# Patient Record
(demographics unavailable — no encounter records)

---

## 2018-04-29 NOTE — XMS REPORT
Continuity of Care Document

 Created on: 2018



NATALIE BERNARD

External Reference #: [id number]

: 1992

Sex: Female



Demographics







 Address  [address]

[place]  [address]

 

 Home Phone  [phone] x

 

 Preferred Language  Unknown

 

 Marital Status  Unknown

 

 Zoroastrian Affiliation  Unknown

 

 Race  Unknown

 

 Ethnic Group  Unknown





Author







 Author  [facility]

 

 Address  Unknown

 

 Phone  Unavailable



              



Allergies

      



There is no data.                  



Medications

      



There is no data.                  



Problems

      





 Date Dx Coded            Attending            Type            Code            
Diagnosis            Diagnosed By        

 

 2009                                      626.4            irregular 
length of menstrual periods                     

 

 2009                                      V25.41            visit for: 
contraceptive surveillance pill                     

 

 2009                                      626.4            irregular 
length of menstrual periods                     

 

 2009                                      V25.41            visit for: 
contraceptive surveillance pill                     

 

 2009                                      626.4            irregular 
length of menstrual periods                     

 

 2009                                      V25.41            visit for: 
contraceptive surveillance pill                     

 

 2009                                      626.4            irregular 
length of menstrual periods                     

 

 2009                                      V25.41            visit for: 
contraceptive surveillance pill                     

 

 2009            HANY ANDRADE                         626.4      
      irregular length of menstrual periods                     

 

 2009            HANY ANDRADE                         V25.41     
       visit for: contraceptive surveillance pill                     

 

 2009            GENA TEE                         626.4      
      irregular length of menstrual periods                     

 

 2009            GENA TEE                         V25.41     
       visit for: contraceptive surveillance pill                     

 

 2009            FAVIO NORMAN                         
626.4            irregular length of menstrual periods                     

 

 2009            FAVIO NORMAN                         
V25.41            visit for: contraceptive surveillance pill                   
  

 

 2009            RICKY COHEN                         626.4 
           irregular length of menstrual periods                     

 

 2009            RICKY COHEN R                         V25.41
            visit for: contraceptive surveillance pill                     

 

 2009            JH SANDY DO                         626.4          
  irregular length of menstrual periods                     

 

 2009            JH SANDY DO                         V25.41         
   visit for: contraceptive surveillance pill                     

 

 2009                                      V20.2            Preventive 
Medicine Establ. Patient Checkup Adolescent 12-17                     

 

 2009                                      V20.2            Preventive 
Medicine Establ. Patient Checkup Adolescent 12-17                     

 

 2009                                      V20.2            Preventive 
Medicine Establ. Patient Checkup Adolescent 12-17                     

 

 2009                                      V20.2            Preventive 
Medicine Establ. Patient Checkup Adolescent 12-17                     

 

 2009            HANY ANDRADE                         V20.2      
      Preventive Medicine Establ. Patient Checkup Adolescent 12-17             
        

 

 2009            GENA TEE                         V20.2      
      Preventive Medicine Establ. Patient Checkup Adolescent 12-17             
        

 

 2009            FAVIO NORMAN                         
V20.2            Preventive Medicine Establ. Patient Checkup Adolescent 12-17  
                   

 

 2009            RICKY COHEN                         V20.2 
           Preventive Medicine Establ. Patient Checkup Adolescent 12-17        
             

 

 2009            JH SANDY DO                         V20.2          
  Preventive Medicine Establ. Patient Checkup Adolescent                                       477.9            RHINITIS     
                

 

 2010                                      692.71            SUNBURN     
                

 

 2010                                      V25.40            
CONTRACEPTIVE SURVEILLANCE UNSPECIFIED                     

 

 2010                                      V72.42            PREGNANCY 
TEST POSITIVE RESULT                     

 

 2010                                      V74.1            SPECIAL 
SCREENING EXAMINATION FOR PULMONARY TUBERCULOSIS                     

 

 2010                                      477.9            RHINITIS     
                

 

 2010                                      692.71            SUNBURN     
                

 

 2010                                      V25.40            
CONTRACEPTIVE SURVEILLANCE UNSPECIFIED                     

 

 2010                                      V72.42            PREGNANCY 
TEST POSITIVE RESULT                     

 

 2010                                      V74.1            SPECIAL 
SCREENING EXAMINATION FOR PULMONARY TUBERCULOSIS                     

 

 2010                                      477.9            RHINITIS     
                

 

 2010                                      692.71            SUNBURN     
                

 

 2010                                      V25.40            
CONTRACEPTIVE SURVEILLANCE UNSPECIFIED                     

 

 2010                                      V72.42            PREGNANCY 
TEST POSITIVE RESULT                     

 

 2010                                      V74.1            SPECIAL 
SCREENING EXAMINATION FOR PULMONARY TUBERCULOSIS                     

 

 2010                                      477.9            RHINITIS     
                

 

 2010                                      692.71            SUNBURN     
                

 

 2010                                      V25.40            
CONTRACEPTIVE SURVEILLANCE UNSPECIFIED                     

 

 2010                                      V72.42            PREGNANCY 
TEST POSITIVE RESULT                     

 

 2010                                      V74.1            SPECIAL 
SCREENING EXAMINATION FOR PULMONARY TUBERCULOSIS                     

 

 2010            HANY ANDRADE                         477.9      
      RHINITIS                     

 

 2010            HANY ANDRADE                         692.71     
       SUNBURN                     

 

 2010            HANY ANDRADE                         V25.40     
       CONTRACEPTIVE SURVEILLANCE UNSPECIFIED                     

 

 2010            HANY ANDRADE                         V72.42     
       PREGNANCY TEST POSITIVE RESULT                     

 

 2010            HANY ANDRADE                         V74.1      
      SPECIAL SCREENING EXAMINATION FOR PULMONARY TUBERCULOSIS                 
    

 

 2010            BENTLEY TEEIDI A                         477.9      
      RHINITIS                     

 

 2010            ALYX HORVATHGENA A                         692.71     
       SUNBURN                     

 

 2010            BENTLEY TEEIDI A                         V25.40     
       CONTRACEPTIVE SURVEILLANCE UNSPECIFIED                     

 

 2010            ALYX HORVATH GENA A                         V72.42     
       PREGNANCY TEST POSITIVE RESULT                     

 

 2010            BENTLEY TEEIDI A                         V74.1      
      SPECIAL SCREENING EXAMINATION FOR PULMONARY TUBERCULOSIS                 
    

 

 2010            SHALOM NORMANCY N                         
477.9            RHINITIS                     

 

 2010            SILVERIOSHALOM PLATTCY N                         
692.71            SUNBURN                     

 

 2010            SHALOM NORMANCY N                         
V25.40            CONTRACEPTIVE SURVEILLANCE UNSPECIFIED                     

 

 2010            SHALOM NORMANCY N                         
V72.42            PREGNANCY TEST POSITIVE RESULT                     

 

 2010            FAVIO NORMAN N                         
V74.1            SPECIAL SCREENING EXAMINATION FOR PULMONARY TUBERCULOSIS      
               

 

 2010            IRINA APRNAVDEEP RICKY R                         477.9 
           RHINITIS                     

 

 2010            IRINA APRNAVDEEP RICKY R                         692.71
            SUNBURN                     

 

 2010            IRINA APRSARA SETHIRICKY R                         V25.40
            CONTRACEPTIVE SURVEILLANCE UNSPECIFIED                     

 

 2010            IRINA APRSARA SETHIRICKY R                         V72.42
            PREGNANCY TEST POSITIVE RESULT                     

 

 2010            AREVALO APRSARA SETHIRICKY R                         V74.1 
           SPECIAL SCREENING EXAMINATION FOR PULMONARY TUBERCULOSIS            
         

 

 2010            SANDY DO, JH K                         477.9          
  RHINITIS                     

 

 2010            SANDY DO, JH K                         692.71         
   SUNBURN                     

 

 2010            SANDY DO, JH K                         V25.40         
   CONTRACEPTIVE SURVEILLANCE UNSPECIFIED                     

 

 2010            SANDY DO, JH K                         V72.42         
   PREGNANCY TEST POSITIVE RESULT                     

 

 2010            SANDY DO, JH K                         V74.1          
  SPECIAL SCREENING EXAMINATION FOR PULMONARY TUBERCULOSIS                     

 

 2010                                      780.52            INSOMNIA 
UNSPECIFIED                     

 

 2010                                      786.2            COUGH        
             

 

 2010                                      780.52            INSOMNIA 
UNSPECIFIED                     

 

 2010                                      786.2            COUGH        
             

 

 2010                                      780.52            INSOMNIA 
UNSPECIFIED                     

 

 2010                                      786.2            COUGH        
             

 

 2010                                      780.52            INSOMNIA 
UNSPECIFIED                     

 

 2010                                      786.2            COUGH        
             

 

 2010            HANY ANDRADE                         780.52     
       INSOMNIA UNSPECIFIED                     

 

 2010            HANY ANDRADE                         786.2      
      COUGH                     

 

 2010            ALYXBENTLEY CARRIDI A                         780.52     
       INSOMNIA UNSPECIFIED                     

 

 2010            ALYXBENTLEY CARRIDI A                         786.2      
      COUGH                     

 

 2010            NUSRAT OKEEFEFAVIO CONTRERAS N                         
780.52            INSOMNIA UNSPECIFIED                     

 

 2010            NUSRAT OKEEFEFAVIO CONTRERAS N                         
786.2            COUGH                     

 

 2010            SARA COHENRICIA R                         780.52
            INSOMNIA UNSPECIFIED                     

 

 2010            IRINA HORVATH RICKY R                         786.2 
           COUGH                     

 

 2010            SANDY DO, JH K                         780.52         
   INSOMNIA UNSPECIFIED                     

 

 2010            SANDY DO, JH K                         786.2          
  COUGH                     

 

 2010                                      780.50            SLEEP 
DISTURBANCE, UNSPECIFIED                     

 

 2010                                      780.50            SLEEP 
DISTURBANCE, UNSPECIFIED                     

 

 2010                                      780.50            SLEEP 
DISTURBANCE, UNSPECIFIED                     

 

 2010                                      780.50            SLEEP 
DISTURBANCE, UNSPECIFIED                     

 

 2010            HANY ANDRADE                         780.50     
       SLEEP DISTURBANCE, UNSPECIFIED                     

 

 2010            ALYXGENA CARR A                         780.50     
       SLEEP DISTURBANCE, UNSPECIFIED                     

 

 2010            NUSRAT OKEEFEMATTHEW APRFAVIO SETHI N                         
780.50            SLEEP DISTURBANCE, UNSPECIFIED                     

 

 2010            SARA COHENRICIA R                         780.50
            SLEEP DISTURBANCE, UNSPECIFIED                     

 

 2010            SANDY DO, JH K                         780.50         
   SLEEP DISTURBANCE, UNSPECIFIED                     

 

 10/13/2010                                      465.9            UPPER 
RESPIRATORY INFECTION                     

 

 10/13/2010                                      465.9            UPPER 
RESPIRATORY INFECTION                     

 

 10/13/2010                                      465.9            UPPER 
RESPIRATORY INFECTION                     

 

 10/13/2010                                      465.9            UPPER 
RESPIRATORY INFECTION                     

 

 10/13/2010            HANY ANDRADE                         465.9      
      UPPER RESPIRATORY INFECTION                     

 

 10/13/2010            GENA TEE A                         465.9      
      UPPER RESPIRATORY INFECTION                     

 

 10/13/2010            NUSRAT OKEEFEFAVIO CONTRERAS N                         
465.9            UPPER RESPIRATORY INFECTION                     

 

 10/13/2010            SARA COHENRICIA R                         465.9 
           UPPER RESPIRATORY INFECTION                     

 

 10/13/2010            SANDY DO, JH K                         465.9          
  UPPER RESPIRATORY INFECTION                     

 

 2010                                      V25.49            SURVEILLANCE 
OF OTHER CONTRACEPTIVE METHOD                     

 

 2010                                      V25.49            SURVEILLANCE 
OF OTHER CONTRACEPTIVE METHOD                     

 

 2010                                      V25.49            SURVEILLANCE 
OF OTHER CONTRACEPTIVE METHOD                     

 

 2010                                      V25.49            SURVEILLANCE 
OF OTHER CONTRACEPTIVE METHOD                     

 

 2010            HANY ANDRADE                         V25.49     
       SURVEILLANCE OF OTHER CONTRACEPTIVE METHOD                     

 

 2010            GENA TEE A                         V25.49     
       SURVEILLANCE OF OTHER CONTRACEPTIVE METHOD                     

 

 2010            FAVIO NORMAN N                         
V25.49            SURVEILLANCE OF OTHER CONTRACEPTIVE METHOD                   
  

 

 2010            RICKY COHEN                         V25.49
            SURVEILLANCE OF OTHER CONTRACEPTIVE METHOD                     

 

 2010            JH SANDY DO                         V25.49         
   SURVEILLANCE OF OTHER CONTRACEPTIVE METHOD                     

 

 2011                                      V25.09            
CONTRACEPTIVE COUNSELING                     

 

 2011                                      V65.45            STD 
COUNSELING                     

 

 2011                                      V72.31            GYN EXAM, 
ROUTINE                     

 

 2011                                      V74.5            STD SCREEN   
                  

 

 2011                                      V25.09            
CONTRACEPTIVE COUNSELING                     

 

 2011                                      V65.45            STD 
COUNSELING                     

 

 2011                                      V72.31            GYN EXAM, 
ROUTINE                     

 

 2011                                      V74.5            STD SCREEN   
                  

 

 2011                                      V25.09            
CONTRACEPTIVE COUNSELING                     

 

 2011                                      V65.45            STD 
COUNSELING                     

 

 2011                                      V72.31            GYN EXAM, 
ROUTINE                     

 

 2011                                      V74.5            STD SCREEN   
                  

 

 2011                                      V25.09            
CONTRACEPTIVE COUNSELING                     

 

 2011                                      V65.45            STD 
COUNSELING                     

 

 2011                                      V72.31            GYN EXAM, 
ROUTINE                     

 

 2011                                      V74.5            STD SCREEN   
                  

 

 2011            HANY ANDRADE                         V25.09     
       CONTRACEPTIVE COUNSELING                     

 

 2011            HANY ANDRADE                         V65.45     
       STD COUNSELING                     

 

 2011            HANY ANDRADE                         V72.31     
       GYN EXAM, ROUTINE                     

 

 2011            HANY ANDRADE                         V74.5      
      STD SCREEN                     

 

 2011            GENA TEE A                         V25.09     
       CONTRACEPTIVE COUNSELING                     

 

 2011            BENTLEY TEEIDI A                         V65.45     
       STD COUNSELING                     

 

 2011            ALYX HORVATH GENA A                         V72.31     
       GYN EXAM, ROUTINE                     

 

 2011            ALYX HORVATH GENA A                         V74.5      
      STD SCREEN                     

 

 2011            SILVERIO CASHERO APRN, FAVIO N                         
V25.09            CONTRACEPTIVE COUNSELING                     

 

 2011            NUSRAT GUTIÉRREZ APRN, FAVIO N                         
V65.45            STD COUNSELING                     

 

 2011            NUSRAT GUTIÉRREZ APRN, FAVIO N                         
V72.31            GYN EXAM, ROUTINE                     

 

 2011            NUSRAT GUTIÉRREZ APRN, FAVIO N                         
V74.5            STD SCREEN                     

 

 2011            IRINA APRN, RICKY R                         V25.09
            CONTRACEPTIVE COUNSELING                     

 

 2011            IRINA BIGGSN, RICKY R                         V65.45
            STD COUNSELING                     

 

 2011            IRINA BIGGSN, RICKY R                         V72.31
            GYN EXAM, ROUTINE                     

 

 2011            IRINA BIGGSN, RICKY R                         V74.5 
           STD SCREEN                     

 

 2011            SANDY DO JH K                         V25.09         
   CONTRACEPTIVE COUNSELING                     

 

 2011            SANDY DO JH K                         V65.45         
   STD COUNSELING                     

 

 2011            SANDY DO, JH K                         V72.31         
   GYN EXAM, ROUTINE                     

 

 2011            SANDY DO JH K                         V74.5          
  STD SCREEN                     

 

 2013                                      008.8            
GASTROENTERITIS, VIRAL                     

 

 2013                                      V06.1            TDAP DX      
               

 

 2013                                      008.8            
GASTROENTERITIS, VIRAL                     

 

 2013                                      V06.1            TDAP DX      
               

 

 2013                                      008.8            
GASTROENTERITIS, VIRAL                     

 

 2013                                      V06.1            TDAP DX      
               

 

 2013            HANY ANDRADE                         008.8      
      GASTROENTERITIS, VIRAL                     

 

 2013            HANY ANDRADE                         V06.1      
      TDAP DX                     

 

 2013            GENA TEE A                         008.8      
      GASTROENTERITIS, VIRAL                     

 

 2013            ALYX BIGGSN GENA A                         V06.1      
      TDAP DX                     

 

 2013            NUSRAT GUTIÉRREZ APRN, FAVIO N                         
008.8            GASTROENTERITIS, VIRAL                     

 

 2013            NUSRAT GUTIÉRREZ APRN, FAVIO N                         
V06.1            TDAP DX                     

 

 2013            IRINA APRNAVDEEP, RICKY R                         008.8 
           GASTROENTERITIS, VIRAL                     

 

 2013            IRINA APRN, RICKY R                         V06.1 
           TDAP DX                     

 

 2013            SANDY DO JH K                         008.8          
  GASTROENTERITIS, VIRAL                     

 

 2013            SANDY DO JH K                         V06.1          
  TDAP DX                     

 

 2013                                      625.8            OTHER 
SPECIFIED SYMPTOMS ASSOCIATED WITH FEMALE GENITAL ORGANS                     

 

 2013                                      V25.01            
CONTRACEPTION - ORAL CONTRACEPTION                     

 

 2013                                      625.8            OTHER 
SPECIFIED SYMPTOMS ASSOCIATED WITH FEMALE GENITAL ORGANS                     

 

 2013                                      V25.01            
CONTRACEPTION - ORAL CONTRACEPTION                     

 

 2013            MACKENZIE BIGGSHANY SETHI                         625.8      
      OTHER SPECIFIED SYMPTOMS ASSOCIATED WITH FEMALE GENITAL ORGANS           
          

 

 2013            MACKENZIE BIGGSHANY SETHI                         V25.01     
       CONTRACEPTION - ORAL CONTRACEPTION                     

 

 2013            BENTLEY TEEIDI A                         625.8      
      OTHER SPECIFIED SYMPTOMS ASSOCIATED WITH FEMALE GENITAL ORGANS           
          

 

 2013            BENTLEY TEEIDI A                         V25.01     
       CONTRACEPTION - ORAL CONTRACEPTION                     

 

 2013            NUSRAT GUTIÉRREZ APRFAVIO SETHI N                         
625.8            OTHER SPECIFIED SYMPTOMS ASSOCIATED WITH FEMALE GENITAL ORGANS
                     

 

 2013            NUSRAT GUTIÉRREZ APRFAVIO SETHI N                         
V25.01            CONTRACEPTION - ORAL CONTRACEPTION                     

 

 2013            RICKY COHEN R                         625.8 
           OTHER SPECIFIED SYMPTOMS ASSOCIATED WITH FEMALE GENITAL ORGANS      
               

 

 2013            RICKY COHEN R                         V25.01
            CONTRACEPTION - ORAL CONTRACEPTION                     

 

 2013            SANDY DO, JH K                         625.8          
  OTHER SPECIFIED SYMPTOMS ASSOCIATED WITH FEMALE GENITAL ORGANS               
      

 

 2013            SANDY DO, JH K                         V25.01         
   CONTRACEPTION - ORAL CONTRACEPTION                     

 

 01/15/2014            BENTLEY TEEIDI A                         278.00     
       OBESITY                     

 

 01/15/2014            BENTLEY TEEIDI A                         626.0      
      AMENORRHEA                     

 

 01/15/2014            NUSRAT GUTIÉRREZ APRFAVIO SETHI N                         
278.00            OBESITY                     

 

 01/15/2014            NUSRAT GUTIÉRREZ APRFAVIO SETHI N                         
626.0            AMENORRHEA                     

 

 01/15/2014            DANIEL COHENIA R                         278.00
            OBESITY                     

 

 01/15/2014            DANIEL COHENIA R                         626.0 
           AMENORRHEA                     

 

 01/15/2014            SANDY DO, JH K                         278.00         
   OBESITY                     

 

 01/15/2014            SANDY DO, JH K                         626.0          
  AMENORRHEA                     

 

 2014            FAVIO NORMAN N                         
388.72            REFERRED OTOGENIC PAIN                     

 

 2014            FAVIO NORMAN N                         
478.19            OTHER DISEASES OF NASAL CAVITY AND SINUSES                   
  

 

 2014            FAVIO NORMAN N                         
784.1            THROAT PAIN                     

 

 2014            RICKY COHEN R                         388.72
            REFERRED OTOGENIC PAIN                     

 

 2014            IRINA BIGGSNAVDEEPSARARICKY R                         478.19
            OTHER DISEASES OF NASAL CAVITY AND SINUSES                     

 

 2014            AREVALO SARA HORVATHRICIA R                         784.1 
           THROAT PAIN                     

 

 2014            JH SANDY DO                         388.72         
   REFERRED OTOGENIC PAIN                     

 

 2014            JH SANDY DO                         478.19         
   OTHER DISEASES OF NASAL CAVITY AND SINUSES                     

 

 2014            JH SANDY DO                         784.1          
  THROAT PAIN                     

 

 2015            JH SANDY DO                         692.4          
  CONTACT DERMATITIS AND OTHER ECZEMA DUE TO OTHER CHEMICAL PRODUCTS           
          

 

 2015            JH SANDY DO                         V72.41         
   PREGNANCY TEST NEGATIVE RESULT                     



                                                                               
                                                                               
                                                                               
                                                                               
                                                                               
                         



Procedures

      





 Code            Description            Performed By            Performed On   
     

 

             93799                                  UA LONG DIP                
                   2013        

 

             66595                                  URINE PREGNANCY TEST (IN-
HOUSE)                                   2013        

 

             64742                                  TRICHOMONAS (IN-HOUSE)     
                              2013        

 

             36624                                  CULTURE UROGENITAL         
                          08/15/2013        

 

             87073                                  CULTURE URINE              
                     08/15/2013        

 

             36559                                  GC/CHLAM PROBE ([facility])     
                              08/15/2013        

 

             10012                                  HERPES SIMPLEX CULTURE     
                              08/15/2013        

 

             81803                                  PREGNANCY TEST, URINE (IN-
HOUSE)                                   01/15/2014        

 

             16364                                  ROUTINE VENIPUNCTURE       
                            2014        

 

             [id number]                                  HCG QUALITATIVE W/ 
REFLEX                                   2014        

 

             74494                                  LIPID PANEL                
                   2014        

 

             56635                                  A1C (RML)                  
                 2014        

 

             68911                                  TSH                        
           2014        

 

             63568                                  INSULIN LEVEL              
                     2014        

 

             36905                                  TESTOSTERONE TOTAL-WOMEN & 
CHILDREN                                   2014        

 

             HERPSM1,2                                  HERPES SIMPLEX 1 AND 2, 
IGM, IGG                                   2014        

 

             17095                                  OXIMETRY                   
                2014        

 

             56376                                  PREGNANCY TEST, URINE (IN-
HOUSE)                                   2015        



                                                    



Results

      





 Test            Result            Range        









 Pap Lb, rfx HPV ASCU - 17 14:58         









 DIAGNOSIS:            Comment                      

 

 Specimen adequacy:            Comment                      

 

 Clinician provided ICD10:            Comment                      

 

 Performed by:            Comment                      

 

 .            .                      

 

 Note:            Comment                      

 

 .            Comment                      









 Genital Culture, Routine - 17 14:58         









 Genital Culture, Routine            Note                      









 CULTURE, GENITAL - 17 16:23         









 Genital Culture, Routine            Final report             NRG        

 

 Result 1                         NRG        









 Genital Culture, Routine - 17 16:23         









 Genital Culture, Routine            Note                      









 CBC With Differential/Platelet - 17 13:09         









 WBC            4.9 x10E3/uL            3.4-10.8        

 

 RBC            4.55 x10E6/uL            3.77-5.28        

 

 Hemoglobin            12.1 g/dL            11.1-15.9        

 

 Hematocrit            37.8 %            34.0-46.6        

 

 MCV            83 fL            79-97        

 

 MCH            26.6 pg            26.6-33.0        

 

 MCHC            32.0 g/dL            31.5-35.7        

 

 RDW            13.8 %            12.3-15.4        

 

 Platelets            334 x10E3/uL            150-379        

 

 Neutrophils            54 %                     

 

 Lymphs            36 %                     

 

 Monocytes            5 %                     

 

 Eos            4 %                     

 

 Basos            1 %                     

 

 Neutrophils (Absolute)            2.6 x10E3/uL            1.4-7.0        

 

 Lymphs (Absolute)            1.8 x10E3/uL            0.7-3.1        

 

 Monocytes(Absolute)            0.2 x10E3/uL            0.1-0.9        

 

 Eos (Absolute)            0.2 x10E3/uL            0.0-0.4        

 

 Baso (Absolute)            0.1 x10E3/uL            0.0-0.2        

 

 Immature Granulocytes            0 %                     

 

 Immature Grans (Abs)            0.0 x10E3/uL            0.0-0.1        









 Comp. Metabolic Panel (14) - 17 13:09         









 Glucose, Serum            88 mg/dL            65-99        

 

 BUN            10 mg/dL            6-20        

 

 Creatinine, Serum            0.73 mg/dL            0.57-1.00        

 

 eGFR If NonAfricn Am            116 mL/min/1.73                >59        

 

 eGFR If Africn Am            133 mL/min/1.73                >59        

 

 BUN/Creatinine Ratio            14             9-23        

 

 Sodium, Serum            142 mmol/L            134-144        

 

 Potassium, Serum            4.7 mmol/L            3.5-5.2        

 

 Chloride, Serum            104 mmol/L                    

 

 Carbon Dioxide, Total            23 mmol/L            18-29        

 

 Calcium, Serum            9.2 mg/dL            8.7-10.2        

 

 Protein, Total, Serum            6.9 g/dL            6.0-8.5        

 

 Albumin, Serum            4.2 g/dL            3.5-5.5        

 

 Globulin, Total            2.7 g/dL            1.5-4.5        

 

 A/G Ratio            1.6             1.2-2.2        

 

 Bilirubin, Total            0.5 mg/dL            0.0-1.2        

 

 Alkaline Phosphatase, S            49 IU/L                    

 

 AST (SGOT)            17 IU/L            0-40        

 

 ALT (SGPT)            17 IU/L            0-32        









 Lipid Panel - 17 13:09         









 Cholesterol, Total            148 mg/dL            100-199        

 

 Triglycerides            57 mg/dL            0-149        

 

 HDL Cholesterol            48 mg/dL            >39        

 

 VLDL Cholesterol Rodolfo            11 mg/dL            5-40        

 

 LDL Cholesterol Calc            89 mg/dL            0-99        









 Hemoglobin A1c - 17 13:09         









 Hemoglobin A1c            5.3 %            4.8-5.6        









 FSH, Serum - 17 13:09         









 FSH            5.9 mIU/mL                     









 Prolactin - 17 13:09         









 Prolactin            10.4 ng/mL            4.8-23.3        









 Estradiol - 17 13:09         









 Estradiol            41.1 pg/mL                     









 Thyroid Cascade Profile - 17 13:09         









 TSH            1.030 uIU/mL            0.450-4.500        









 Insulin - 17 13:09         









 Insulin            11.3 uIU/mL            2.6-24.9        









 CBC - 17 13:09         









 WBC            4.9 x10E3/uL            3.4-10.8        

 

 RBC            4.55 x10E6/uL            3.77-5.28        

 

 Hemoglobin            12.1 g/dL            11.1-15.9        

 

 Hematocrit            37.8 %            34.0-46.6        

 

 MCV            83 fL            79-97        

 

 MCH            26.6 pg            26.6-33.0        

 

 MCHC            32.0 g/dL            31.5-35.7        

 

 RDW            13.8 %            12.3-15.4        

 

 Platelets            334 x10E3/uL            150-379        

 

 Neutrophils            54 %            NRG        

 

 Lymphs            36 %            NRG        

 

 Monocytes            5 %            NRG        

 

 Eos            4 %            NRG        

 

 Basos            1 %            NRG        

 

 Neutrophils (Absolute)            2.6 x10E3/uL            1.4-7.0        

 

 Lymphs (Absolute)            1.8 x10E3/uL            0.7-3.1        

 

 Monocytes(Absolute)            0.2 x10E3/uL            0.1-0.9        

 

 Eos (Absolute)            0.2 x10E3/uL            0.0-0.4        

 

 Baso (Absolute)            0.1 x10E3/uL            0.0-0.2        

 

 Immature Granulocytes            0 %            NRG        

 

 Immature Grans (Abs)            0.0 x10E3/uL            0.0-0.1        









 LIPID PANEL - 17 13:09         









 Cholesterol, Total            148 mg/dL            100-199        

 

 Triglycerides            57 mg/dL            0-149        

 

 HDL Cholesterol            48 mg/dL            >39        

 

 VLDL Cholesterol Rodolfo            11 mg/dL            5-40        

 

 LDL Cholesterol Calc            89 mg/dL            0-99        









 CMP - 17 13:09         









 Glucose, Serum            88 mg/dL            65-99        

 

 BUN            10 mg/dL            6-20        

 

 Creatinine, Serum            0.73 mg/dL            0.57-1.00        

 

 eGFR If NonAfricn Am            116 mL/min/1.73                >59        

 

 eGFR If Africn Am            133 mL/min/1.73                >59        

 

 BUN/Creatinine Ratio            14             9-23        

 

 Sodium, Serum            142 mmol/L            134-144        

 

 Potassium, Serum            4.7 mmol/L            3.5-5.2        

 

 Chloride, Serum            104 mmol/L                    

 

 Carbon Dioxide, Total            23 mmol/L            18-29        

 

 Calcium, Serum            9.2 mg/dL            8.7-10.2        

 

 Protein, Total, Serum            6.9 g/dL            6.0-8.5        

 

 Albumin, Serum            4.2 g/dL            3.5-5.5        

 

 Globulin, Total            2.7 g/dL            1.5-4.5        

 

 A/G Ratio            1.6             1.2-2.2        

 

 Bilirubin, Total            0.5 mg/dL            0.0-1.2        

 

 Alkaline Phosphatase, S            49 IU/L                    

 

 AST (SGOT)            17 IU/L            0-40        

 

 ALT (SGPT)            17 IU/L            0-32        









 CULTURE, URINE - 17 10:23         









 CULTURE, URINE, ROUTINE            SEE NOTE             NRG        









 VITAMIN B12 - 18 12:25         









 VITAMIN B12            415 pg/mL            200-1100        









 VITAMIN D, 25-H - 18 12:25         









 VITAMIN D,25-OH,TOTAL,IA            23 ng/mL                    









 CULTURE, THROAT - 18 12:22         









 CULTURE, THROAT            SEE NOTE             NRG        



                                                      



Encounters

      





 ACCT No.            Visit Date/Time            Discharge            Status    
        Pt. Type            Provider            Facility            Loc./Unit  
          Complaint        

 

 [id number]            2015 13:31:00            2015 23:59:59          
  CLS            Outpatient            JH SANDY DO                        
                       

 

 [id number]            2014 14:48:00            2014 23:59:59          
  CLS            Outpatient            RICKY COHEN               
                                

 

 [id number]            2014 10:22:00            2014 23:59:59          
  CLS            Outpatient            FAVIO NORMAN           
                                    

 

 [id number]            2014 08:55:00            2014 23:59:59          
  CLS            Outpatient            GENA TEE                    
                           

 

 [id number]            2013 10:41:00            2013 23:59:59          
  CLS            Outpatient            HANY ANDRADE                    
                           

 

 [id number]            2013 14:32:00            2013 23:59:59          
  CLS            Outpatient                                                    
        

 

 [id number]            2012 14:11:00            2012 23:59:59            
CLS            Outpatient                                                      
      

 

 [id number]            2013 13:03:00                                      
Document Registration                                                          
  

 

 [id number]            2013 13:42:00                                      
Document Registration                                                          
  

 

 [id number]            2017 14:10:00                                   
   Document Registration                                                       
     

 

 [id number]            2017 09:08:00                                   
   Document Registration                                                       
     

 

 [id number]            2017 16:07:00                                   
   Document Registration                                                       
     

 

 [id number]            2018 10:30:00            2018 23:59:59            
CLS            Outpatient            RICHARD TYRON                         
[facility]                     

 

 [id number]            2018 11:40:00                                      
Document Registration                                                          
  

 

 [id number]            2018 11:00:00                                      
Document Registration                                                          
  

 

 [id number]            2017 09:40:00                                      
Document Registration                                                          
  

 

 [id number]            2017 13:00:00                                      
Document Registration                                                          
  

 

 [id number]            2017 15:20:00                                      
Document Registration                                                          
  

 

 [id number]            2017 14:06:00                                   
   Document Registration

## 2018-04-29 NOTE — XMS REPORT
[facility]

 Created on: 2018



Louise Duvall

External Reference #: [id number]

: 1992

Sex: Female



Demographics







 Address  [address] [place]  [address]

 

 Preferred Language  Unknown

 

 Marital Status  Unknown

 

 Latter-day Affiliation  Unknown

 

 Race  Unknown

 

 Ethnic Group  Unknown





Author







 Author  FITO  ANIL

 

 [facility]

 

 Address  [address] [place]  [address]



 

 Phone  [phone]







Care Team Providers







 Care Team Member Name  Role  Phone

 

 KEY PAYTONY  Unavailable  [phone]







PROBLEMS







 Type  Condition  ICD9-CM Code  OYD23-BQ Code  Onset Dates  Condition Status  
SNOMED Code

 

 Problem  Vitamin D insufficiency     E55.9     Active  563228754

 

 Problem  Obesity (BMI 30.0-34.9)     E66.9     Active  636253947098236

 

 Problem  Amenorrhea     N91.2     Active  97026221

 

 Problem  Seasonal allergic rhinitis, unspecified trigger     J30.2     Active  
177363304

 

 Problem  Moderate episode of recurrent major depressive disorder     F33.1     
Active  550479956

 

 Problem  Burning sensation of skin     R20.8     Active  32059803

 

 Problem  Irregular menstrual cycle     N92.6     Active  48652740

 

 Problem  Major depressive disorder, single episode, unspecified     F32.9     
Active  16573706

 

 Problem  Acute stress reaction     F43.0     Active  38348544







ALLERGIES

No Known Allergies



ENCOUNTERS







 Encounter  Location  Date  Diagnosis

 

 [place] [id number] [place] [address]-
1289  14 May, 2018   

 

 [place] [address] [address]-
1732    Sore throat J02.9 and Seasonal allergic rhinitis, 
unspecified trigger J30.2

 

 [facility]  [id number] [place] [address]-
4510     

 

 [place] [address] [address]-
4761     

 

 [facility]  [id number] [place] [address]-
1648    Sore throat J02.9 and Viral pharyngitis J02.9

 

 [place] [id number] [place] [address]
0049  05 2018  Moderate episode of recurrent major depressive disorder 
F33.1 ; Other fatigue R53.83 and Oral contraception initial prescription Z30.011

 

 [facility] [place] [id number] [place] [phone]
-[phone]  30 Dec, 2017  Acute non-recurrent frontal sinusitis J01.10

 

 [place] [address] [address]-
2590  19 Dec, 2017  Dysuria R30.0 ; Burning sensation of skin R20.8 and Acute 
stress reaction F43.0

 

 [facility] [place] [address] [address]
[phone]  13 Dec, 2017  Other viral agents as the cause of diseases classified 
elsewhere B97.89 and Acute upper respiratory infection, unspecified J06.9

 

 [place] [id number] [place] [address]27-
2985  07 Dec, 2017  Dysuria R30.0 and Acute cystitis with hematuria N30.01

 

 [place] [id number] [place] [address]-
3729  27 Oct, 2017  Encounter to establish care Z76.89 ; Obesity (BMI 30.0-34.9
) E66.9 and Irregular menstrual cycle N92.6

 

 [place] [id number] [place] [address]-
7104  24 Oct, 2017   

 

 [place] [id number] [place] [address]-
9837  22 Sep, 2017  Amenorrhea N91.2

 

 [place] [id number] [place] [address] 16993-
4160  21 Sep, 2017  Vaginal discharge N89.8 ; Amenorrhea N91.2 and Obesity (BMI 
30.0-34.9) E66.9

 

 [place] [id number] [place] [address]-
4032  25 Aug, 2017  Plantar fasciitis of left foot M72.2

 

 [facility]  [address] [place] [phone]
[phone]  01 May, 2017  Sore throat J02.9 and Strep pharyngitis J02.0

 

 [facility]  [id number] [place] [address] 30498
-[phone]    Sore throat J02.9 and Acute tonsillitis due to other 
specified organisms J03.80

 

 [place] [address] [address]-
2039  14 2017  Routine gynecological examination Z01.419 and Depot 
contraception Z30.40

 

 [place] [address] [address]-
6174  03 Aug, 2016  Recent urinary tract infection Z87.440

 

 [place] [id number] [place] [address]697-
3449    Acute cystitis without hematuria N30.00

 

 [place] [id number] [place] [address] 09891-
0507    Urinary tract infection N39.0

 

 [place] [id number] [place] [address] 23994-
7964  23 May, 2016  Sinusitis, unspecified chronicity, unspecified location 
J32.9

 

 [place] [id number] [place] [address] 44166-
0144    Allergic conjunctivitis H10.10

 

 [place] [id number] [place] [address] 79194-
0849  28 Oct, 2015  Allergic rhinitis J30.9

 

 [place] [id number] [place] [address] 83024-
5519  25 Oct, 2015   

 

 [place] [id number] [place] [address]-
0674  22 Oct, 2015  Encounter for screening for malignant neoplasm of cervix 
Z12.4 ; Missed period N92.6 ; Vaginal discharge N89.8 ; Weight gain R63.5 and 
Acne L70.9

 

 [place] [id number] [address]-
7226    Nausea and vomiting 787.01 and UTI (urinary tract infection
) 599.0

 

 [facility]  [address] [id number] [place] [address]-
2666    Eustachian tube dysfunction 381.81

 

 [facility]  [address] [id number] [place] [address]-
1706     

 

 [facility]  [address] [id number] [place] [address]-
8596     

 

 [facility]  [address] [id number] [place] [address]-
5666     

 

 [facility]  [address] [id number] [place] [address]-
1716     

 

 [facility]  [address] [id number] [place] [address]-
4786  02 Sep, 2014   

 

 [facility]  [address] [id number] [place] [address]-
3191  02 Sep, 2014   

 

 [facility]  [address] [id number] [place] [address]-
8016  13 Aug, 2014   

 

 [facility]  [address] [id number] [place] [address]-
8741  13 Aug, 2014   

 

 [facility]  [address] [id number] [place] [address]-
2946  13 May, 2014   

 

 [facility]  [address] [id number] [address]
2546  13 May, 2014   

 

 [facility]  [address] [id number] [place] [address]-
6686     

 

 [facility]  [address] [id number] [address]
2546     

 

 [facility]  [address] [id number] [address]-
4386     

 

 [facility]  [address] [id number] [place] [address]-
9940     

 

 [facility]  [address] [id number] [place] [address]-
8869     

 

 [facility] [place] [facility]  [address] [id number] [place] [address]-
6958     

 

 [facility]  [address] [id number] [place] [address]-
5014     

 

 [facility]  [address] [id number] [place] [address]-
1426  15 Harsha, 2014   

 

 [facility]  [address] [id number] [place] [address]-
3536  15 Harsha, 2014   

 

 [facility]  [address] [id number] [place] [address]-
4106     

 

 [facility]  [address] [id number] [place] [address]-
9621     

 

 [facility]  [address] [id number] [place] [address]-
0179  30 Aug, 2013   

 

 [facility]  [address] [id number] [place] [address]-
4749  19 Aug, 2013   

 

 [facility] [place] [facility]  [address] [id number] [place] [address]-
0791  15 Aug, 2013   

 

 [facility] [place] [facility]  [address] [id number] [place] [address]-
0197  14 Aug, 2013   

 

 [facility] [place] [facility]  [address] [id number] [place] [address]-
6378  13 Aug, 2013   

 

 [facility] [place] [facility]  [address] [id number] [place] [address]-
3362  27 Mar, 2013   

 

 [facility] [place] [facility]  [address] [id number] [place] [address]-
8311  20 Mar, 2013   

 

 [facility] [place] [facility]  [address] [id number] [address]-
2546     

 

 [facility]  [id number] [address]-
5892     

 

 [facility]  [id number] [address]-
1036     

 

 [facility]  [id number] [address]-
2546  15 Mar, 2012   

 

 [facility]  [id number] [address]-
3348  05 Mar, 2012   

 

 [facility]  [id number] [address]-
1592     

 

 [facility]  [id number] [address]-
2406  23 Dec, 2011   

 

 [facility]  [id number] [address]-
5825  14 Sep, 2011   

 

 [facility]  [id number] [address]-
7011  21 Dec, 2010   

 

 [facility]  [id number] [address]-
0982  13 Oct, 2010   

 

 [facility]  [id number] [address]-
1259  13 Oct, 2010   

 

 [facility]  [id number] [address]-
4344  14 Aug, 2009   

 

 [facility]  [id number] [address]-
5399     







IMMUNIZATIONS

No Known Immunizations



SOCIAL HISTORY

Never Assessed



REASON FOR VISIT

Pain (acute) on left foot -- chong weston



PLAN OF CARE







 Activity  Details









  









 Follow Up  prn Reason:







VITAL SIGNS







 Height  62 in  2017

 

 Weight  175.9 lbs  2017

 

 Temperature  98.0 degrees Fahrenheit  2017

 

 Heart Rate  78 bpm  2017

 

 Respiratory Rate  18   2017

 

 BMI  32.17 kg/m2  2017

 

 Blood pressure systolic  120 mmHg  2017

 

 Blood pressure diastolic  76 mmHg  2017







MEDICATIONS







 Medication  Instructions  Dosage  Frequency  Start Date  End Date  Duration  
Status

 

 Naprosyn 500 mg  Orally every 12 hrs  1 tablet as needed  12h  25 Aug, 2017   
     Active







RESULTS

No Results



PROCEDURES

No Known procedures



INSTRUCTIONS





MEDICATIONS ADMINISTERED

No Known Medications



MEDICAL (GENERAL) HISTORY







 Type  Description  Date

 

 Medical History  Intestinal infection due to other organism   

 

 Hospitalization History  pneumonia as a child

## 2018-04-29 NOTE — XMS REPORT
Continuity of Care Document

 Created on: 2018



NATALIE BERNARD

External Reference #: [id number]

: 1992

Sex: Female



Demographics







 Address  [address]

[place]  [address]

 

 Home Phone  [phone] x

 

 Preferred Language  Unknown

 

 Marital Status  Unknown

 

 Anglican Affiliation  Unknown

 

 Race  Unknown

 

 Ethnic Group  Unknown





Author







 Author  [facility]

 

 Address  Unknown

 

 Phone  Unavailable



              



Allergies

      



There is no data.                  



Medications

      



There is no data.                  



Problems

      





 Date Dx Coded            Attending            Type            Code            
Diagnosis            Diagnosed By        

 

 2009                                      626.4            irregular 
length of menstrual periods                     

 

 2009                                      V25.41            visit for: 
contraceptive surveillance pill                     

 

 2009                                      626.4            irregular 
length of menstrual periods                     

 

 2009                                      V25.41            visit for: 
contraceptive surveillance pill                     

 

 2009                                      626.4            irregular 
length of menstrual periods                     

 

 2009                                      V25.41            visit for: 
contraceptive surveillance pill                     

 

 2009                                      626.4            irregular 
length of menstrual periods                     

 

 2009                                      V25.41            visit for: 
contraceptive surveillance pill                     

 

 2009            HANY ANDRADE                         626.4      
      irregular length of menstrual periods                     

 

 2009            HANY ANDRADE                         V25.41     
       visit for: contraceptive surveillance pill                     

 

 2009            GENA TEE                         626.4      
      irregular length of menstrual periods                     

 

 2009            GENA TEE                         V25.41     
       visit for: contraceptive surveillance pill                     

 

 2009            FAVIO NORMAN                         
626.4            irregular length of menstrual periods                     

 

 2009            FAVIO NORMAN                         
V25.41            visit for: contraceptive surveillance pill                   
  

 

 2009            RICKY COHEN                         626.4 
           irregular length of menstrual periods                     

 

 2009            RICKY COHEN R                         V25.41
            visit for: contraceptive surveillance pill                     

 

 2009            JH SANDY DO                         626.4          
  irregular length of menstrual periods                     

 

 2009            JH SANDY DO                         V25.41         
   visit for: contraceptive surveillance pill                     

 

 2009                                      V20.2            Preventive 
Medicine Establ. Patient Checkup Adolescent 12-17                     

 

 2009                                      V20.2            Preventive 
Medicine Establ. Patient Checkup Adolescent 12-17                     

 

 2009                                      V20.2            Preventive 
Medicine Establ. Patient Checkup Adolescent 12-17                     

 

 2009                                      V20.2            Preventive 
Medicine Establ. Patient Checkup Adolescent 12-17                     

 

 2009            HANY ANDRADE                         V20.2      
      Preventive Medicine Establ. Patient Checkup Adolescent 12-17             
        

 

 2009            GENA TEE                         V20.2      
      Preventive Medicine Establ. Patient Checkup Adolescent 12-17             
        

 

 2009            FAVIO NORMAN                         
V20.2            Preventive Medicine Establ. Patient Checkup Adolescent 12-17  
                   

 

 2009            RICKY COHEN                         V20.2 
           Preventive Medicine Establ. Patient Checkup Adolescent 12-17        
             

 

 2009            JH SANDY DO                         V20.2          
  Preventive Medicine Establ. Patient Checkup Adolescent                                       477.9            RHINITIS     
                

 

 2010                                      692.71            SUNBURN     
                

 

 2010                                      V25.40            
CONTRACEPTIVE SURVEILLANCE UNSPECIFIED                     

 

 2010                                      V72.42            PREGNANCY 
TEST POSITIVE RESULT                     

 

 2010                                      V74.1            SPECIAL 
SCREENING EXAMINATION FOR PULMONARY TUBERCULOSIS                     

 

 2010                                      477.9            RHINITIS     
                

 

 2010                                      692.71            SUNBURN     
                

 

 2010                                      V25.40            
CONTRACEPTIVE SURVEILLANCE UNSPECIFIED                     

 

 2010                                      V72.42            PREGNANCY 
TEST POSITIVE RESULT                     

 

 2010                                      V74.1            SPECIAL 
SCREENING EXAMINATION FOR PULMONARY TUBERCULOSIS                     

 

 2010                                      477.9            RHINITIS     
                

 

 2010                                      692.71            SUNBURN     
                

 

 2010                                      V25.40            
CONTRACEPTIVE SURVEILLANCE UNSPECIFIED                     

 

 2010                                      V72.42            PREGNANCY 
TEST POSITIVE RESULT                     

 

 2010                                      V74.1            SPECIAL 
SCREENING EXAMINATION FOR PULMONARY TUBERCULOSIS                     

 

 2010                                      477.9            RHINITIS     
                

 

 2010                                      692.71            SUNBURN     
                

 

 2010                                      V25.40            
CONTRACEPTIVE SURVEILLANCE UNSPECIFIED                     

 

 2010                                      V72.42            PREGNANCY 
TEST POSITIVE RESULT                     

 

 2010                                      V74.1            SPECIAL 
SCREENING EXAMINATION FOR PULMONARY TUBERCULOSIS                     

 

 2010            HANY ANDRADE                         477.9      
      RHINITIS                     

 

 2010            HANY ANDRADE                         692.71     
       SUNBURN                     

 

 2010            HANY ANDRADE                         V25.40     
       CONTRACEPTIVE SURVEILLANCE UNSPECIFIED                     

 

 2010            HANY ANDRADE                         V72.42     
       PREGNANCY TEST POSITIVE RESULT                     

 

 2010            HANY ANDRADE                         V74.1      
      SPECIAL SCREENING EXAMINATION FOR PULMONARY TUBERCULOSIS                 
    

 

 2010            BENTLEY TEEIDI A                         477.9      
      RHINITIS                     

 

 2010            ALYX HORVATHGENA A                         692.71     
       SUNBURN                     

 

 2010            BENTLEY TEEIDI A                         V25.40     
       CONTRACEPTIVE SURVEILLANCE UNSPECIFIED                     

 

 2010            ALYX HORVATH GENA A                         V72.42     
       PREGNANCY TEST POSITIVE RESULT                     

 

 2010            BENTLEY TEEIDI A                         V74.1      
      SPECIAL SCREENING EXAMINATION FOR PULMONARY TUBERCULOSIS                 
    

 

 2010            SHALOM NORMANCY N                         
477.9            RHINITIS                     

 

 2010            SILVERIOSHALOM PLATTCY N                         
692.71            SUNBURN                     

 

 2010            SHALOM NORMANCY N                         
V25.40            CONTRACEPTIVE SURVEILLANCE UNSPECIFIED                     

 

 2010            SHALOM NORMANCY N                         
V72.42            PREGNANCY TEST POSITIVE RESULT                     

 

 2010            FAVIO NORMAN N                         
V74.1            SPECIAL SCREENING EXAMINATION FOR PULMONARY TUBERCULOSIS      
               

 

 2010            IRINA APRNAVDEEP RICKY R                         477.9 
           RHINITIS                     

 

 2010            IRINA APRNAVDEEP RICKY R                         692.71
            SUNBURN                     

 

 2010            IRINA APRSARA SETHIRICKY R                         V25.40
            CONTRACEPTIVE SURVEILLANCE UNSPECIFIED                     

 

 2010            IRINA APRSARA SETHIRICKY R                         V72.42
            PREGNANCY TEST POSITIVE RESULT                     

 

 2010            AREVALO APRSARA SETHIRICKY R                         V74.1 
           SPECIAL SCREENING EXAMINATION FOR PULMONARY TUBERCULOSIS            
         

 

 2010            SANDY DO, JH K                         477.9          
  RHINITIS                     

 

 2010            SANDY DO, JH K                         692.71         
   SUNBURN                     

 

 2010            SANDY DO, JH K                         V25.40         
   CONTRACEPTIVE SURVEILLANCE UNSPECIFIED                     

 

 2010            SANDY DO, JH K                         V72.42         
   PREGNANCY TEST POSITIVE RESULT                     

 

 2010            SANDY DO, JH K                         V74.1          
  SPECIAL SCREENING EXAMINATION FOR PULMONARY TUBERCULOSIS                     

 

 2010                                      780.52            INSOMNIA 
UNSPECIFIED                     

 

 2010                                      786.2            COUGH        
             

 

 2010                                      780.52            INSOMNIA 
UNSPECIFIED                     

 

 2010                                      786.2            COUGH        
             

 

 2010                                      780.52            INSOMNIA 
UNSPECIFIED                     

 

 2010                                      786.2            COUGH        
             

 

 2010                                      780.52            INSOMNIA 
UNSPECIFIED                     

 

 2010                                      786.2            COUGH        
             

 

 2010            HANY ANDRADE                         780.52     
       INSOMNIA UNSPECIFIED                     

 

 2010            HANY ANDRADE                         786.2      
      COUGH                     

 

 2010            ALYXBENTLEY CARRIDI A                         780.52     
       INSOMNIA UNSPECIFIED                     

 

 2010            ALYXBENTLEY CARRIDI A                         786.2      
      COUGH                     

 

 2010            NUSRAT OKEEFEFAVIO CONTRERAS N                         
780.52            INSOMNIA UNSPECIFIED                     

 

 2010            NUSRAT OKEEFEFAVIO CONTRERAS N                         
786.2            COUGH                     

 

 2010            SARA COHENRICIA R                         780.52
            INSOMNIA UNSPECIFIED                     

 

 2010            IRINA HORVATH RICKY R                         786.2 
           COUGH                     

 

 2010            SANDY DO, JH K                         780.52         
   INSOMNIA UNSPECIFIED                     

 

 2010            SANDY DO, JH K                         786.2          
  COUGH                     

 

 2010                                      780.50            SLEEP 
DISTURBANCE, UNSPECIFIED                     

 

 2010                                      780.50            SLEEP 
DISTURBANCE, UNSPECIFIED                     

 

 2010                                      780.50            SLEEP 
DISTURBANCE, UNSPECIFIED                     

 

 2010                                      780.50            SLEEP 
DISTURBANCE, UNSPECIFIED                     

 

 2010            HANY ANDRADE                         780.50     
       SLEEP DISTURBANCE, UNSPECIFIED                     

 

 2010            ALYXGENA CARR A                         780.50     
       SLEEP DISTURBANCE, UNSPECIFIED                     

 

 2010            NUSRAT OKEEFEMATTHEW APRFAVIO SETHI N                         
780.50            SLEEP DISTURBANCE, UNSPECIFIED                     

 

 2010            SARA COHENRICIA R                         780.50
            SLEEP DISTURBANCE, UNSPECIFIED                     

 

 2010            SANDY DO, JH K                         780.50         
   SLEEP DISTURBANCE, UNSPECIFIED                     

 

 10/13/2010                                      465.9            UPPER 
RESPIRATORY INFECTION                     

 

 10/13/2010                                      465.9            UPPER 
RESPIRATORY INFECTION                     

 

 10/13/2010                                      465.9            UPPER 
RESPIRATORY INFECTION                     

 

 10/13/2010                                      465.9            UPPER 
RESPIRATORY INFECTION                     

 

 10/13/2010            HANY ANDRADE                         465.9      
      UPPER RESPIRATORY INFECTION                     

 

 10/13/2010            GENA TEE A                         465.9      
      UPPER RESPIRATORY INFECTION                     

 

 10/13/2010            NUSRAT OKEEFEFAVIO CONTRERAS N                         
465.9            UPPER RESPIRATORY INFECTION                     

 

 10/13/2010            SARA COHENRICIA R                         465.9 
           UPPER RESPIRATORY INFECTION                     

 

 10/13/2010            SANDY DO, JH K                         465.9          
  UPPER RESPIRATORY INFECTION                     

 

 2010                                      V25.49            SURVEILLANCE 
OF OTHER CONTRACEPTIVE METHOD                     

 

 2010                                      V25.49            SURVEILLANCE 
OF OTHER CONTRACEPTIVE METHOD                     

 

 2010                                      V25.49            SURVEILLANCE 
OF OTHER CONTRACEPTIVE METHOD                     

 

 2010                                      V25.49            SURVEILLANCE 
OF OTHER CONTRACEPTIVE METHOD                     

 

 2010            HANY ANDRADE                         V25.49     
       SURVEILLANCE OF OTHER CONTRACEPTIVE METHOD                     

 

 2010            GENA TEE A                         V25.49     
       SURVEILLANCE OF OTHER CONTRACEPTIVE METHOD                     

 

 2010            FAVIO NORMAN N                         
V25.49            SURVEILLANCE OF OTHER CONTRACEPTIVE METHOD                   
  

 

 2010            RICKY COHEN                         V25.49
            SURVEILLANCE OF OTHER CONTRACEPTIVE METHOD                     

 

 2010            JH SANDY DO                         V25.49         
   SURVEILLANCE OF OTHER CONTRACEPTIVE METHOD                     

 

 2011                                      V25.09            
CONTRACEPTIVE COUNSELING                     

 

 2011                                      V65.45            STD 
COUNSELING                     

 

 2011                                      V72.31            GYN EXAM, 
ROUTINE                     

 

 2011                                      V74.5            STD SCREEN   
                  

 

 2011                                      V25.09            
CONTRACEPTIVE COUNSELING                     

 

 2011                                      V65.45            STD 
COUNSELING                     

 

 2011                                      V72.31            GYN EXAM, 
ROUTINE                     

 

 2011                                      V74.5            STD SCREEN   
                  

 

 2011                                      V25.09            
CONTRACEPTIVE COUNSELING                     

 

 2011                                      V65.45            STD 
COUNSELING                     

 

 2011                                      V72.31            GYN EXAM, 
ROUTINE                     

 

 2011                                      V74.5            STD SCREEN   
                  

 

 2011                                      V25.09            
CONTRACEPTIVE COUNSELING                     

 

 2011                                      V65.45            STD 
COUNSELING                     

 

 2011                                      V72.31            GYN EXAM, 
ROUTINE                     

 

 2011                                      V74.5            STD SCREEN   
                  

 

 2011            HANY ANDRADE                         V25.09     
       CONTRACEPTIVE COUNSELING                     

 

 2011            HANY ANDRADE                         V65.45     
       STD COUNSELING                     

 

 2011            HANY ANDRADE                         V72.31     
       GYN EXAM, ROUTINE                     

 

 2011            HANY ANDRADE                         V74.5      
      STD SCREEN                     

 

 2011            GENA TEE A                         V25.09     
       CONTRACEPTIVE COUNSELING                     

 

 2011            BENTLEY TEEIDI A                         V65.45     
       STD COUNSELING                     

 

 2011            ALYX HORVATH GENA A                         V72.31     
       GYN EXAM, ROUTINE                     

 

 2011            ALYX HORVATH GENA A                         V74.5      
      STD SCREEN                     

 

 2011            SILVERIO CASHERO APRN, FAVIO N                         
V25.09            CONTRACEPTIVE COUNSELING                     

 

 2011            NUSRAT GUTIÉRREZ APRN, FAVIO N                         
V65.45            STD COUNSELING                     

 

 2011            NUSRAT GUTIÉRREZ APRN, FAVIO N                         
V72.31            GYN EXAM, ROUTINE                     

 

 2011            NUSRAT GUTIÉRREZ APRN, FAVIO N                         
V74.5            STD SCREEN                     

 

 2011            IRINA APRN, RICKY R                         V25.09
            CONTRACEPTIVE COUNSELING                     

 

 2011            IRINA BIGGSN, RICKY R                         V65.45
            STD COUNSELING                     

 

 2011            IRINA BIGGSN, RICKY R                         V72.31
            GYN EXAM, ROUTINE                     

 

 2011            IRINA BIGGSN, RICKY R                         V74.5 
           STD SCREEN                     

 

 2011            SANDY DO JH K                         V25.09         
   CONTRACEPTIVE COUNSELING                     

 

 2011            SANDY DO JH K                         V65.45         
   STD COUNSELING                     

 

 2011            SANDY DO, JH K                         V72.31         
   GYN EXAM, ROUTINE                     

 

 2011            SANDY DO JH K                         V74.5          
  STD SCREEN                     

 

 2013                                      008.8            
GASTROENTERITIS, VIRAL                     

 

 2013                                      V06.1            TDAP DX      
               

 

 2013                                      008.8            
GASTROENTERITIS, VIRAL                     

 

 2013                                      V06.1            TDAP DX      
               

 

 2013                                      008.8            
GASTROENTERITIS, VIRAL                     

 

 2013                                      V06.1            TDAP DX      
               

 

 2013            HANY ANDRADE                         008.8      
      GASTROENTERITIS, VIRAL                     

 

 2013            HANY ANDRADE                         V06.1      
      TDAP DX                     

 

 2013            GENA TEE A                         008.8      
      GASTROENTERITIS, VIRAL                     

 

 2013            ALYX BIGGSN GENA A                         V06.1      
      TDAP DX                     

 

 2013            NUSRAT GUTIÉRREZ APRN, FAVIO N                         
008.8            GASTROENTERITIS, VIRAL                     

 

 2013            NUSRAT GUTIÉRREZ APRN, FAVIO N                         
V06.1            TDAP DX                     

 

 2013            IRINA APRNAVDEEP, RICKY R                         008.8 
           GASTROENTERITIS, VIRAL                     

 

 2013            IRINA APRN, RICKY R                         V06.1 
           TDAP DX                     

 

 2013            SANDY DO JH K                         008.8          
  GASTROENTERITIS, VIRAL                     

 

 2013            SANDY DO JH K                         V06.1          
  TDAP DX                     

 

 2013                                      625.8            OTHER 
SPECIFIED SYMPTOMS ASSOCIATED WITH FEMALE GENITAL ORGANS                     

 

 2013                                      V25.01            
CONTRACEPTION - ORAL CONTRACEPTION                     

 

 2013                                      625.8            OTHER 
SPECIFIED SYMPTOMS ASSOCIATED WITH FEMALE GENITAL ORGANS                     

 

 2013                                      V25.01            
CONTRACEPTION - ORAL CONTRACEPTION                     

 

 2013            MACKENZIE BIGGSHANY SETHI                         625.8      
      OTHER SPECIFIED SYMPTOMS ASSOCIATED WITH FEMALE GENITAL ORGANS           
          

 

 2013            MACKENZIE BIGGSHANY SETHI                         V25.01     
       CONTRACEPTION - ORAL CONTRACEPTION                     

 

 2013            BENTLEY TEEIDI A                         625.8      
      OTHER SPECIFIED SYMPTOMS ASSOCIATED WITH FEMALE GENITAL ORGANS           
          

 

 2013            BENTLEY TEEIDI A                         V25.01     
       CONTRACEPTION - ORAL CONTRACEPTION                     

 

 2013            NUSRAT GUTIÉRREZ APRFAVIO SETHI N                         
625.8            OTHER SPECIFIED SYMPTOMS ASSOCIATED WITH FEMALE GENITAL ORGANS
                     

 

 2013            NUSRAT GUTIÉRREZ APRFAVIO SETHI N                         
V25.01            CONTRACEPTION - ORAL CONTRACEPTION                     

 

 2013            RICKY COHEN R                         625.8 
           OTHER SPECIFIED SYMPTOMS ASSOCIATED WITH FEMALE GENITAL ORGANS      
               

 

 2013            RICKY COHEN R                         V25.01
            CONTRACEPTION - ORAL CONTRACEPTION                     

 

 2013            SANDY DO, JH K                         625.8          
  OTHER SPECIFIED SYMPTOMS ASSOCIATED WITH FEMALE GENITAL ORGANS               
      

 

 2013            SANDY DO, JH K                         V25.01         
   CONTRACEPTION - ORAL CONTRACEPTION                     

 

 01/15/2014            BENTLEY TEEIDI A                         278.00     
       OBESITY                     

 

 01/15/2014            BENTLEY TEEIDI A                         626.0      
      AMENORRHEA                     

 

 01/15/2014            NUSRAT GUTIÉRREZ APRFAVIO SETHI N                         
278.00            OBESITY                     

 

 01/15/2014            NUSRAT GUTIÉRREZ APRFAVIO SETHI N                         
626.0            AMENORRHEA                     

 

 01/15/2014            DANIEL COHENIA R                         278.00
            OBESITY                     

 

 01/15/2014            DANIEL COHENIA R                         626.0 
           AMENORRHEA                     

 

 01/15/2014            SANDY DO, JH K                         278.00         
   OBESITY                     

 

 01/15/2014            SANDY DO, JH K                         626.0          
  AMENORRHEA                     

 

 2014            FAVIO NORMAN N                         
388.72            REFERRED OTOGENIC PAIN                     

 

 2014            FAVIO NORMAN N                         
478.19            OTHER DISEASES OF NASAL CAVITY AND SINUSES                   
  

 

 2014            FAVIO NORMAN N                         
784.1            THROAT PAIN                     

 

 2014            RICKY COHEN R                         388.72
            REFERRED OTOGENIC PAIN                     

 

 2014            IRINA BIGGSNAVDEEPSARARICKY R                         478.19
            OTHER DISEASES OF NASAL CAVITY AND SINUSES                     

 

 2014            AREVALO SARA HORVATHRICIA R                         784.1 
           THROAT PAIN                     

 

 2014            JH SANDY DO                         388.72         
   REFERRED OTOGENIC PAIN                     

 

 2014            JH SANDY DO                         478.19         
   OTHER DISEASES OF NASAL CAVITY AND SINUSES                     

 

 2014            JH SANDY DO                         784.1          
  THROAT PAIN                     

 

 2015            JH SANDY DO                         692.4          
  CONTACT DERMATITIS AND OTHER ECZEMA DUE TO OTHER CHEMICAL PRODUCTS           
          

 

 2015            JH SANDY DO                         V72.41         
   PREGNANCY TEST NEGATIVE RESULT                     



                                                                               
                                                                               
                                                                               
                                                                               
                                                                               
                         



Procedures

      





 Code            Description            Performed By            Performed On   
     

 

             73741                                  UA LONG DIP                
                   2013        

 

             14181                                  URINE PREGNANCY TEST (IN-
HOUSE)                                   2013        

 

             90273                                  TRICHOMONAS (IN-HOUSE)     
                              2013        

 

             91746                                  CULTURE UROGENITAL         
                          08/15/2013        

 

             05241                                  CULTURE URINE              
                     08/15/2013        

 

             37991                                  GC/CHLAM PROBE ([facility])     
                              08/15/2013        

 

             65355                                  HERPES SIMPLEX CULTURE     
                              08/15/2013        

 

             65722                                  PREGNANCY TEST, URINE (IN-
HOUSE)                                   01/15/2014        

 

             87995                                  ROUTINE VENIPUNCTURE       
                            2014        

 

             [id number]                                  HCG QUALITATIVE W/ 
REFLEX                                   2014        

 

             68675                                  LIPID PANEL                
                   2014        

 

             04069                                  A1C (RML)                  
                 2014        

 

             24584                                  TSH                        
           2014        

 

             00170                                  INSULIN LEVEL              
                     2014        

 

             30790                                  TESTOSTERONE TOTAL-WOMEN & 
CHILDREN                                   2014        

 

             HERPSM1,2                                  HERPES SIMPLEX 1 AND 2, 
IGM, IGG                                   2014        

 

             61700                                  OXIMETRY                   
                2014        

 

             05022                                  PREGNANCY TEST, URINE (IN-
HOUSE)                                   2015        



                                                    



Results

      





 Test            Result            Range        









 Pap Lb, rfx HPV ASCU - 17 14:58         









 DIAGNOSIS:            Comment                      

 

 Specimen adequacy:            Comment                      

 

 Clinician provided ICD10:            Comment                      

 

 Performed by:            Comment                      

 

 .            .                      

 

 Note:            Comment                      

 

 .            Comment                      









 Genital Culture, Routine - 17 14:58         









 Genital Culture, Routine            Note                      









 CULTURE, GENITAL - 17 16:23         









 Genital Culture, Routine            Final report             NRG        

 

 Result 1                         NRG        









 Genital Culture, Routine - 17 16:23         









 Genital Culture, Routine            Note                      









 CBC With Differential/Platelet - 17 13:09         









 WBC            4.9 x10E3/uL            3.4-10.8        

 

 RBC            4.55 x10E6/uL            3.77-5.28        

 

 Hemoglobin            12.1 g/dL            11.1-15.9        

 

 Hematocrit            37.8 %            34.0-46.6        

 

 MCV            83 fL            79-97        

 

 MCH            26.6 pg            26.6-33.0        

 

 MCHC            32.0 g/dL            31.5-35.7        

 

 RDW            13.8 %            12.3-15.4        

 

 Platelets            334 x10E3/uL            150-379        

 

 Neutrophils            54 %                     

 

 Lymphs            36 %                     

 

 Monocytes            5 %                     

 

 Eos            4 %                     

 

 Basos            1 %                     

 

 Neutrophils (Absolute)            2.6 x10E3/uL            1.4-7.0        

 

 Lymphs (Absolute)            1.8 x10E3/uL            0.7-3.1        

 

 Monocytes(Absolute)            0.2 x10E3/uL            0.1-0.9        

 

 Eos (Absolute)            0.2 x10E3/uL            0.0-0.4        

 

 Baso (Absolute)            0.1 x10E3/uL            0.0-0.2        

 

 Immature Granulocytes            0 %                     

 

 Immature Grans (Abs)            0.0 x10E3/uL            0.0-0.1        









 Comp. Metabolic Panel (14) - 17 13:09         









 Glucose, Serum            88 mg/dL            65-99        

 

 BUN            10 mg/dL            6-20        

 

 Creatinine, Serum            0.73 mg/dL            0.57-1.00        

 

 eGFR If NonAfricn Am            116 mL/min/1.73                >59        

 

 eGFR If Africn Am            133 mL/min/1.73                >59        

 

 BUN/Creatinine Ratio            14             9-23        

 

 Sodium, Serum            142 mmol/L            134-144        

 

 Potassium, Serum            4.7 mmol/L            3.5-5.2        

 

 Chloride, Serum            104 mmol/L                    

 

 Carbon Dioxide, Total            23 mmol/L            18-29        

 

 Calcium, Serum            9.2 mg/dL            8.7-10.2        

 

 Protein, Total, Serum            6.9 g/dL            6.0-8.5        

 

 Albumin, Serum            4.2 g/dL            3.5-5.5        

 

 Globulin, Total            2.7 g/dL            1.5-4.5        

 

 A/G Ratio            1.6             1.2-2.2        

 

 Bilirubin, Total            0.5 mg/dL            0.0-1.2        

 

 Alkaline Phosphatase, S            49 IU/L                    

 

 AST (SGOT)            17 IU/L            0-40        

 

 ALT (SGPT)            17 IU/L            0-32        









 Lipid Panel - 17 13:09         









 Cholesterol, Total            148 mg/dL            100-199        

 

 Triglycerides            57 mg/dL            0-149        

 

 HDL Cholesterol            48 mg/dL            >39        

 

 VLDL Cholesterol Rodolfo            11 mg/dL            5-40        

 

 LDL Cholesterol Calc            89 mg/dL            0-99        









 Hemoglobin A1c - 17 13:09         









 Hemoglobin A1c            5.3 %            4.8-5.6        









 FSH, Serum - 17 13:09         









 FSH            5.9 mIU/mL                     









 Prolactin - 17 13:09         









 Prolactin            10.4 ng/mL            4.8-23.3        









 Estradiol - 17 13:09         









 Estradiol            41.1 pg/mL                     









 Thyroid Cascade Profile - 17 13:09         









 TSH            1.030 uIU/mL            0.450-4.500        









 Insulin - 17 13:09         









 Insulin            11.3 uIU/mL            2.6-24.9        









 CBC - 17 13:09         









 WBC            4.9 x10E3/uL            3.4-10.8        

 

 RBC            4.55 x10E6/uL            3.77-5.28        

 

 Hemoglobin            12.1 g/dL            11.1-15.9        

 

 Hematocrit            37.8 %            34.0-46.6        

 

 MCV            83 fL            79-97        

 

 MCH            26.6 pg            26.6-33.0        

 

 MCHC            32.0 g/dL            31.5-35.7        

 

 RDW            13.8 %            12.3-15.4        

 

 Platelets            334 x10E3/uL            150-379        

 

 Neutrophils            54 %            NRG        

 

 Lymphs            36 %            NRG        

 

 Monocytes            5 %            NRG        

 

 Eos            4 %            NRG        

 

 Basos            1 %            NRG        

 

 Neutrophils (Absolute)            2.6 x10E3/uL            1.4-7.0        

 

 Lymphs (Absolute)            1.8 x10E3/uL            0.7-3.1        

 

 Monocytes(Absolute)            0.2 x10E3/uL            0.1-0.9        

 

 Eos (Absolute)            0.2 x10E3/uL            0.0-0.4        

 

 Baso (Absolute)            0.1 x10E3/uL            0.0-0.2        

 

 Immature Granulocytes            0 %            NRG        

 

 Immature Grans (Abs)            0.0 x10E3/uL            0.0-0.1        









 LIPID PANEL - 17 13:09         









 Cholesterol, Total            148 mg/dL            100-199        

 

 Triglycerides            57 mg/dL            0-149        

 

 HDL Cholesterol            48 mg/dL            >39        

 

 VLDL Cholesterol Rodolfo            11 mg/dL            5-40        

 

 LDL Cholesterol Calc            89 mg/dL            0-99        









 CMP - 17 13:09         









 Glucose, Serum            88 mg/dL            65-99        

 

 BUN            10 mg/dL            6-20        

 

 Creatinine, Serum            0.73 mg/dL            0.57-1.00        

 

 eGFR If NonAfricn Am            116 mL/min/1.73                >59        

 

 eGFR If Africn Am            133 mL/min/1.73                >59        

 

 BUN/Creatinine Ratio            14             9-23        

 

 Sodium, Serum            142 mmol/L            134-144        

 

 Potassium, Serum            4.7 mmol/L            3.5-5.2        

 

 Chloride, Serum            104 mmol/L                    

 

 Carbon Dioxide, Total            23 mmol/L            18-29        

 

 Calcium, Serum            9.2 mg/dL            8.7-10.2        

 

 Protein, Total, Serum            6.9 g/dL            6.0-8.5        

 

 Albumin, Serum            4.2 g/dL            3.5-5.5        

 

 Globulin, Total            2.7 g/dL            1.5-4.5        

 

 A/G Ratio            1.6             1.2-2.2        

 

 Bilirubin, Total            0.5 mg/dL            0.0-1.2        

 

 Alkaline Phosphatase, S            49 IU/L                    

 

 AST (SGOT)            17 IU/L            0-40        

 

 ALT (SGPT)            17 IU/L            0-32        









 CULTURE, URINE - 17 10:23         









 CULTURE, URINE, ROUTINE            SEE NOTE             NRG        









 VITAMIN B12 - 18 12:25         









 VITAMIN B12            415 pg/mL            200-1100        









 VITAMIN D, 25-H - 18 12:25         









 VITAMIN D,25-OH,TOTAL,IA            23 ng/mL                    









 CULTURE, THROAT - 18 12:22         









 CULTURE, THROAT            SEE NOTE             NRG        



                                                      



Encounters

      





 ACCT No.            Visit Date/Time            Discharge            Status    
        Pt. Type            Provider            Facility            Loc./Unit  
          Complaint        

 

 [id number]            2015 13:31:00            2015 23:59:59          
  CLS            Outpatient            JH SANDY DO                        
                       

 

 [id number]            2014 14:48:00            2014 23:59:59          
  CLS            Outpatient            RICKY COHEN               
                                

 

 [id number]            2014 10:22:00            2014 23:59:59          
  CLS            Outpatient            FAVIO NORMAN           
                                    

 

 [id number]            2014 08:55:00            2014 23:59:59          
  CLS            Outpatient            GENA TEE                    
                           

 

 [id number]            2013 10:41:00            2013 23:59:59          
  CLS            Outpatient            HANY ANDRADE                    
                           

 

 [id number]            2013 14:32:00            2013 23:59:59          
  CLS            Outpatient                                                    
        

 

 [id number]            2012 14:11:00            2012 23:59:59            
CLS            Outpatient                                                      
      

 

 [id number]            2013 13:03:00                                      
Document Registration                                                          
  

 

 [id number]            2013 13:42:00                                      
Document Registration                                                          
  

 

 [id number]            2017 14:10:00                                   
   Document Registration                                                       
     

 

 [id number]            2017 09:08:00                                   
   Document Registration                                                       
     

 

 [id number]            2017 16:07:00                                   
   Document Registration                                                       
     

 

 [id number]            2018 10:30:00            2018 23:59:59            
CLS            Outpatient            RICHARD TYRON                         
[facility] [place]                     

 

 [id number]            2018 11:40:00                                      
Document Registration                                                          
  

 

 [id number]            2018 11:00:00                                      
Document Registration                                                          
  

 

 [id number]            2017 09:40:00                                      
Document Registration                                                          
  

 

 [id number]            2017 13:00:00                                      
Document Registration                                                          
  

 

 [id number]            2017 15:20:00                                      
Document Registration                                                          
  

 

 [id number]            2017 14:06:00                                   
   Document Registration

## 2018-04-29 NOTE — XMS REPORT
[facility]

 Created on: 2016



Louise Duvall

External Reference #: [id number]

: 1992

Sex: Female



Demographics







 Address  [address] [place]  [address]

 

 Home Phone  [phone]

 

 Preferred Language  Unknown

 

 Marital Status  Unknown

 

 Faith Affiliation  Unknown

 

 Race  Unreported/Refused to Report

 

 Ethnic Group  Not  or 





Author







 Author  MAINE KC

 

 [facility]  eClinicalWorks

 

 Address  Unknown

 

 Phone  Unavailable







Care Team Providers







 Care Team Member Name  Role  Phone

 

 MAINE KC  CP  Unavailable



                                                                



Allergies, Adverse Reactions, Alerts

          





 Substance  Reaction  Event Type

 

 N.K.D.A.  Info Not Available  Non Drug Allergy



                                                                               
         



Problems

          





 Problem Type  Condition  Code  Onset Dates  Condition Status

 

 Problem  Referred otogenic pain, unspecified laterality  H92.09     Active

 

 Problem  Obesity, unspecified obesity severity, unspecified obesity type  
E66.9     Active

 

 Problem  Counseling for birth control, oral contraceptives  Z30.9     Active

 

 Problem  Absence of menstruation  N91.2     Active

 

 Assessment  Recent urinary tract infection  Z87.440     Active



                                                                               
                                                 



Medications

          No Known Medications                                                 
                                       



Procedures

          





 Procedure  Coding System  Code  Date

 

 Office Visit, Est Pt., Level 2  CPT-4  20730  Aug 03, 2016

 

 URINALYSIS, AUTO, W/O SCOPE  CPT-4  13965  Aug 03, 2016



                                                                               
                             



Vital Signs

          





 Date/Time:  Aug 03, 2016

 

 Cardiac Monitoring Heart Rate  80 bpm

 

 Weight  183.2 lbs

 

 Height  62 in

 

 BMI  33.50 Index

 

 Blood Pressure Diastolic  64 mmHg

 

 Blood Pressure Systolic  110 mmHg



                                                                              



Results

          No Known Results                                                     
               



Summary Purpose

          eClinicalWorks Submission

## 2018-04-29 NOTE — XMS REPORT
[facility]

 Created on: 10/26/2015



Louise Duvall

External Reference #: [id number]

: 1992

Sex: Female



Demographics







 Address  [address] [place]  [address]

 

 Home Phone  [phone]

 

 Preferred Language  Unknown

 

 Marital Status  Unknown

 

 Mandaeism Affiliation  Unknown

 

 Race  Unreported/Refused to Report

 

 Ethnic Group  Not  or 





Author







 Author  KISHOR WOODS

 

 [facility]  eClinicalWorks

 

 Address  Unknown

 

 Phone  Unavailable







Care Team Providers







 Care Team Member Name  Role  Phone

 

 KISHOR WOODS    Unavailable



                                                                



Allergies

          No Known Allergies                                                   
                                     



Problems

          





 Problem Type  Condition  Code  Onset Dates  Condition Status

 

 Problem  Referred otogenic pain, unspecified laterality  H92.09     Active

 

 Problem  Obesity, unspecified obesity severity, unspecified obesity type  
E66.9     Active

 

 Problem  Counseling for birth control, oral contraceptives  Z30.9     Active

 

 Problem  Absence of menstruation  N91.2     Active



                                                                               
                                       



Medications

          





 Medication  Code System  Code  Instructions  Start Date  End Date  Status  
Dosage

 

 Diflucan  NDC  [address]-14  150 MG Orally Once a day  Oct 26, 2015        1 
tablet



                                                                              



Results

          No Known Results                                                     
               



Summary Purpose

          eClinicalWorks Submission

## 2018-04-29 NOTE — XMS REPORT
[facility]

 Created on: 2017



JadielLouise

External Reference #: [id number]

: 1992

Sex: Female



Demographics







 Address  [address] [place]  [address]

 

 Preferred Language  Unknown

 

 Marital Status  Unknown

 

 Shinto Affiliation  Unknown

 

 Race  Unknown

 

 Ethnic Group  Unknown





Author







 Author  LUCERO AMAYA

 

 [facility]

 

 Address  [address] [place]  [address]



 

 Phone  [phone]







Care Team Providers







 Care Team Member Name  Role  Phone

 

 MONIQUE  LUCERO  Unavailable  [phone]







PROBLEMS







 Type  Condition  ICD9-CM Code  IZY00-FP Code  Onset Dates  Condition Status  
SNOMED Code

 

 Problem  Obesity (BMI 30.0-34.9)     E66.9     Active  [id number]

 

 Problem  Amenorrhea     N91.2     Active  [id number]







ALLERGIES

No Known Allergies



SOCIAL HISTORY

Never Assessed



PLAN OF CARE







 Activity  Details









  









 Follow Up  1 Year, sooner prn Reason:







VITAL SIGNS







 Height  62 in  2017

 

 Weight  184.5 lbs  2017

 

 Temperature  97.0 degrees Fahrenheit  2017

 

 Heart Rate  76 bpm  2017

 

 Respiratory Rate  18   2017

 

 BMI  33.74 kg/m2  2017

 

 Blood pressure systolic  120 mmHg  2017

 

 Blood pressure diastolic  80 mmHg  2017







MEDICATIONS







 Medication  Instructions  Dosage  Frequency  Start Date  End Date  Duration  
Status

 

 Depo-Provera 150 MG/ML  Intramuscular every 3 months  1 ml        
  1 dose  Active







RESULTS







 Name  Result  Date  Reference Range

 

 PREGNANCY TEST, URINE (IN HOUSE)     2017   

 

 RESULTS  negative      

 

 Lot #  [id number]      

 

 Control  +      

 

 Exp date  2018      

 

 TRICHOMONAS (IN HOUSE)     2017   

 

 TRICHOMONAS  negative      

 

 Control  +      

 

 Lot #  [id number]      

 

 Exp date  2018      

 

 BACTERIAL VAGINOSIS (IN HOUSE)     2017   

 

 RESULTS  negative      

 

 Control  +      

 

 Lot #        

 

 Exp date  2017      

 

 CULTURE, GENITAL     2017   

 

 Genital Culture, Routine  Final report      

 

 Result 1         

 

 PDF Report     2017   

 

 PDF Report1  LCLS      

 

 PAP TEST, HPV IF ASCUS     2017   

 

 DIAGNOSIS:         

 

 Specimen adequacy:         

 

 Clinician provided ICD10:         

 

 Performed by:         

 

 .  .      

 

 Note:         

 

 .         

 

 GC/CHLAM PROBE (STATE)     2017   

 

 CHLAMYDIA         

 

 GC         







PROCEDURES







 Procedure  Date Ordered  Result  Body Site

 

 URINE PREGNANCY TEST  2017      

 

 SPECIMEN HANDLING  2017      

 

 INJ MDRXYPRGESTRON CNTRACPT 150 MG  2017      

 

 THER/PROPH/DIAG INJ, SC/IM  2017      

 

 CULTURE, BACTERIA, OTHER  2017      

 

 TRICHOMONAS ASSAY W/OPTIC  2017      

 

 SWENSON VAG, DNA, DIR PROBE  2017      

 

 No Charge  2017      







IMMUNIZATIONS







 Vaccine  Route  Administration Date  Status

 

 MEDRXYPROGESTERONE ACETATE  IM Intramuscular  2017  Administered







MEDICAL (GENERAL) HISTORY







 Type  Description  Date

 

 Medical History  Intestinal infection due to other organism   

 

 Hospitalization History  pneumonia as a child

## 2018-04-29 NOTE — ED EENT
History of Present Illness


General


Chief Complaint:  Dental Problems/Pain


Stated Complaint:  DENTAL/MOUTH PAIN


Nursing Triage Note:  


PT TO ED 5 W/ FAMILY FOR C/O LT SIDE DENTAL PAIN.  REPORTS HAS TAKEN 


TYLENOL/IBUPROFEN W/ NO IMPROVEMENT


Source:  patient


Exam Limitations:  no limitations





History of Present Illness


Date Seen by Provider:  Apr 29, 2018


Time Seen by Provider:  22:35


Initial Comments


25-year-old female patient presents to the emergency department with complaints 

of left lower dental pain for approximately one week.  Reports taking Tylenol 

and ibuprofen earlier today without improvement in symptoms.


Timing/Duration:  last week


Location:  dental


Prearrival Treatment:  over the counter meds


Modifying Factors:  Worse With Other (worse with palpation)





Allergies and Home Medications


Allergies


Coded Allergies:  


     No Known Drug Allergies (Unverified , 10/17/10)





Home Medications


Amoxicillin 500 Mg Capsule, 1,000 MG PO BID


   Prescribed by: KATHRYN MAN on 4/29/18 2302


Naproxen 500 Mg Tablet, 500 MG PO BID


   Prescribed by: KATHRYN MAN on 4/29/18 2302





Patient Home Medication List


Home Medication List Reviewed:  Yes





Review of Systems


Constitutional:  No chills, No diaphoresis, No dizziness, No fever, No malaise


Eyes:  No Symptoms Reported


Ears:  No Symptoms Reported


Nose:  no symptoms reported


Mouth:  see HPI


Throat:  no symptoms reported; denies painful swallowing, denies difficulty 

with fluids


Respiratory:  no symptoms reported


Cardiovascular:  no symptoms reported


Skin:  no symptoms reported


Neurological:  No Symptoms Reported


All Other Systems Reviewed


Negative Unless Noted:  Yes (Negative excepted noted.)





Past Medical-Social-Family Hx


Patient Social History


Alcohol Use:  Denies Use


Recreational Drug Use:  No


Smoking Status:  Never a Smoker


Recent Foreign Travel:  No


Contact w/Someone Who Travel:  No


Recent Infectious Disease Expo:  No


Recent Hopitalizations:  No


Physical Abuse:  No


Sexual Abuse:  No


Mistreated:  No


Fear:  No





Past Medical History


Surgeries:  No


Respiratory:  No


Cardiac:  No


Neurological:  No


Genitourinary:  No


Gastrointestinal:  No


Musculoskeletal:  No


Endocrine:  No


HEENT:  No


Cancer:  No


Psychosocial:  No


Nursing Suicide Risk Score:  0


Integumentary:  No


Blood Disorders:  No





Family Medical History


Reviewed Nursing Family Hx


No Pertinent Family Hx





Physical Exam


Vital Signs





Vital Signs - First Documented








 4/29/18





 21:54


 


Temp 96.5


 


Pulse 78


 


Resp 16


 


B/P (MAP) 113/81 (92)


 


Pulse Ox 99


 


O2 Delivery Room Air








General Appearance:  WD/WN, no apparent distress


Eyes:  bilateral eye normal inspection, bilateral eye PERRL, bilateral eye EOMI


Ears:  bilateral ear auricle normal, bilateral ear canal normal, bilateral ear 

TM normal


Nose:  normal inspection


Mouth/Throat:  pharynx normal, dental tenderness (left lower dental tenderness.

  Numerous dental caries noted throughout the mouth.); No excessive drooling, 

No mandibular swelling, No maxillary swelling, No uvula swelling, No voice 

changes


Neck:  non-tender, full range of motion, supple, normal inspection


Cardiovascular:  regular rate, rhythm, no murmur


Respiratory:  lungs clear, normal breath sounds, no respiratory distress, no 

accessory muscle use


Neurologic/Psychiatric:  alert, normal mood/affect, oriented x 3


Skin:  normal color, warm/dry





Progress/Results/Core Measures


My Orders





Orders - KATHRYN MAN


Lidocaine 2% Viscous 15 Ml (Xylocaine Vi (4/29/18 23:00)


Tramadol Tablet (Ultram Tablet) (4/29/18 22:54)





Vital Signs/I&O











Blood Pressure Mean:  92











Departure


Impression





 Primary Impression:  


 Dental caries


Disposition:  01 HOME, SELF-CARE


Condition:  Improved





Departure-Patient Inst.


Decision time for Depature:  23:01


Referrals:  


NO,LOCAL PHYSICIAN (PCP/Family)


Primary Care Physician


Patient Instructions:  Dental Pain (DC)





Add. Discharge Instructions:  


All discharge instructions reviewed with patient and/or family. Voiced 

understanding.  Medications as instructed.  Tylenol Extra Strength over-the-

counter as directed for pain.  You may use an ice pack or heating pad as 

needed.  Drink plenty of fluids.  Follow-up with the dentist of your choice for 

recheck and possible need for tooth extraction.  Call for appointment time 

tomorrow morning.  Return to the emergency department for worsened symptoms or 

any other concerns.


Scripts


Naproxen (Naprosyn) 500 Mg Tablet


500 MG PO BID, #14 TAB 0 Refills


   Prov: KATHRYN MAN         4/29/18 


Amoxicillin (Amoxicillin) 500 Mg Capsule


1000 MG PO BID, #28 CAP 0 Refills


   Prov: KATHRYN MAN         4/29/18











KATHRYN MAN Apr 29, 2018 22:35

## 2018-04-29 NOTE — XMS REPORT
[facility]

 Created on: 2017



Jadiel Louise

External Reference #: [id number]

: 1992

Sex: Female



Demographics







 Address  [address] [place]  [address]

 

 Preferred Language  Unknown

 

 Marital Status  Unknown

 

 Muslim Affiliation  Unknown

 

 Race  Unknown

 

 Ethnic Group  Unknown





Author







 Author  ROBERTO MENESES

 

 Organization  [facility]

 

 Address  [address] [place]  [address]



 

 Phone  [phone]







Care Team Providers







 Care Team Member Name  Role  Phone

 

 ROBERTO MENESES  Unavailable  [phone]







PROBLEMS







 Type  Condition  ICD9-CM Code  PQM62-RC Code  Onset Dates  Condition Status  
SNOMED Code

 

 Problem  Obesity (BMI 30.0-34.9)     E66.9     Active  [id number]

 

 Problem  Amenorrhea     N91.2     Active  87539848







ALLERGIES

No Known Allergies



SOCIAL HISTORY

Never Assessed



PLAN OF CARE







 Activity  Details









  









 Follow Up  prn Reason:







VITAL SIGNS







 Height  62 in  2017

 

 Weight  184.2 lbs  2017

 

 Temperature  98.3 degrees Fahrenheit  2017

 

 Heart Rate  80 bpm  2017

 

 Respiratory Rate  20   2017

 

 BMI  33.69 kg/m2  2017

 

 Blood pressure systolic  116 mmHg  2017

 

 Blood pressure diastolic  68 mmHg  2017







MEDICATIONS







 Medication  Instructions  Dosage  Frequency  Start Date  End Date  Duration  
Status

 

 Azithromycin 250 MG  Orally Once a day  2 tablets  on the first day, then 1 
tablet daily for 4 days  24h  01 May, 2017  6 May, 2017  5 day(s)  Active

 

 Depo-Provera 150 MG/ML  Intramuscular every 3 months  1 ml        
  1 dose  Active







RESULTS







 Name  Result  Date  Reference Range

 

 STREP A (IN HOUSE)     2017   

 

 STREP A  positive      

 

 Control  +      

 

 Lot #  [id number]      

 

 Exp date  06 oct 18      







PROCEDURES







 Procedure  Date Ordered  Result  Body Site

 

 STREP A ASSAY W/OPTIC  May 01, 2017      







IMMUNIZATIONS

No Known Immunizations



MEDICAL (GENERAL) HISTORY







 Type  Description  Date

 

 Medical History  Intestinal infection due to other organism   

 

 Hospitalization History  pneumonia as a child

## 2018-04-29 NOTE — XMS REPORT
[facility]

 Created on: 2018



Louise Duvall

External Reference #: [id number]

: 1992

Sex: Female



Demographics







 Address  [address] [place]  [address]

 

 Preferred Language  Unknown

 

 Marital Status  Unknown

 

 Jehovah's witness Affiliation  Unknown

 

 Race  Unknown

 

 Ethnic Group  Unknown





Author







 Author  RAMOSMARTIN IVERSONELE

 

 [facility]

 

 Address  [address] [place]  [address]



 

 Phone  [phone]







Care Team Providers







 Care Team Member Name  Role  Phone

 

 TYRON RAMOS  Unavailable  [phone]







PROBLEMS







 Type  Condition  ICD9-CM Code  NTS13-IN Code  Onset Dates  Condition Status  
SNOMED Code

 

 Problem  Vitamin D insufficiency     E55.9     Active  896556810

 

 Problem  Obesity (BMI 30.0-34.9)     E66.9     Active  022811882675118

 

 Problem  Amenorrhea     N91.2     Active  57145324

 

 Problem  Seasonal allergic rhinitis, unspecified trigger     J30.2     Active  
706407361

 

 Problem  Moderate episode of recurrent major depressive disorder     F33.1     
Active  951329951

 

 Problem  Burning sensation of skin     R20.8     Active  66808243

 

 Problem  Irregular menstrual cycle     N92.6     Active  44054284

 

 Problem  Major depressive disorder, single episode, unspecified     F32.9     
Active  66920454

 

 Problem  Acute stress reaction     F43.0     Active  17931180







ALLERGIES

No Information



ENCOUNTERS







 Encounter  Location  Date  Diagnosis

 

 [place] [address] [address]  14 May, 2018   

 

 [place] [address] [address]
3412    Sore throat J02.9 and Seasonal allergic rhinitis, 
unspecified trigger J30.2

 

 [facility]  [address] [place] [id number] [place] [address]-
1670     

 

 [facility]  [address] [address]     

 

 [facility]  [address] [address]-
[address]    Sore throat J02.9 and Viral pharyngitis J02.9

 

 [place] [address] [place] [address]
75[address]  05 2018  Moderate episode of recurrent major depressive disorder 
F33.1 ; Other fatigue R53.83 and Oral contraception initial prescription Z30.011

 

 [facility] [place] [address] [place] [id number] [place] [address]
-[phone]  30 Dec, 2017  Acute non-recurrent frontal sinusitis J01.10

 

 [place] [address] [place] [id number] [place] [address]-
6454  19 Dec, 2017  Dysuria R30.0 ; Burning sensation of skin R20.8 and Acute 
stress reaction F43.0

 

 [facility] WALK IN [place] [address] [phone]  13 Dec, 2017  Other viral agents as the cause of diseases classified 
elsewhere B97.89 and Acute upper respiratory infection, unspecified J06.9

 

 [place] [address] [place] [id number] [place] [address]-
6052  07 Dec, 2017  Dysuria R30.0 and Acute cystitis with hematuria N30.01

 

 [place] [address] [place] [id number] [place] [address]-
7566  27 Oct, 2017  Encounter to establish care Z76.89 ; Obesity (BMI 30.0-34.9
) E66.9 and Irregular menstrual cycle N92.6

 

 [place] [address] [place] [id number] [place] [address]-
4063  24 Oct, 2017   

 

 [place] [address] [place] [id number] [place] [address]-
7066  22 Sep, 2017  Amenorrhea N91.2

 

 [place] [address] [place] [id number] [place] [address]-
1416  21 Sep, 2017  Vaginal discharge N89.8 ; Amenorrhea N91.2 and Obesity (BMI 
30.0-34.9) E66.9

 

 [place] [address] [place] [id number] [place] [address]-
4355  25 Aug, 2017  Plantar fasciitis of left foot M72.2

 

 [facility]  [address] [place] [id number] [place] [address]
-[phone]  01 May, 2017  Sore throat J02.9 and Strep pharyngitis J02.0

 

 [facility]  [address] [place] [id number] [place] [address]
-[phone]    Sore throat J02.9 and Acute tonsillitis due to other 
specified organisms J03.80

 

 [place] [address] [place] [id number] [place] [address]-
1756  14 2017  Routine gynecological examination Z01.419 and Depot 
contraception Z30.40

 

 [place] [address] [place] [id number] [place] [address]-
2694  03 Aug, 2016  Recent urinary tract infection Z87.440

 

 [place] [address] [place] [id number] [place] [address]-
4557    Acute cystitis without hematuria N30.00

 

 [place] [address] [place] [id number] [place] [address]-
5817    Urinary tract infection N39.0

 

 [place] [address] [place] [id number] [place] [address]-
8358  23 May, 2016  Sinusitis, unspecified chronicity, unspecified location 
J32.9

 

 [place] [address] [place] [id number] [place] [address]-
6740    Allergic conjunctivitis H10.10

 

 [place] [address] [place] [id number] [place] [address]-
6374  28 Oct, 2015  Allergic rhinitis J30.9

 

 [place] [address] [place] [id number] [place] [address]-
4103  25 Oct, 2015   

 

 [place] [address] [place] [id number] [place] [address]-
8260  22 Oct, 2015  Encounter for screening for malignant neoplasm of cervix 
Z12.4 ; Missed period N92.6 ; Vaginal discharge N89.8 ; Weight gain R63.5 and 
Acne L70.9

 

 [place] [address] [place] [address]-
5111    Nausea and vomiting 787.01 and UTI (urinary tract infection
) 599.0

 

 [facility]  [address] [id number] [place] [address]-
0586    Eustachian tube dysfunction 381.81

 

 [facility]  [address] [address]-
34[address]     

 

 [facility]  [address] [facility] [id number] [address]-
7696     

 

 [facility]  [address] [id number] [place] [address]-
8236     

 

 [facility]  [address] [id number] [place] [address]-
5366     

 

 [facility]  [address] [address]  02 Sep, 2014   

 

 [facility]  [address] [facility] [id number] [address]-
3625  02 Sep, 2014   

 

 [facility]  [address] [address]-
79[address]  13 Aug, 2014   

 

 [facility]  [address] [place] [id number] [address]-
9479  13 Aug, 2014   

 

 [facility]  [address] [id number] [place] [address]-
5156  13 May, 2014   

 

 [facility]  [address] [facility] [id number], [place] [address]
2546  13 May, 2014   

 

 [facility]  [address] [id number], [place] [address]-
[address]     

 

 [facility]  [address] [facility] [id number] [address]-
[address]     

 

 [facility]  [address] [place] [id number] [address]-
[address]     

 

 [facility] [place] [facility]  [address] [id number] [place] [address]-
6441     

 

 [facility] [place] [facility]  [address] [id number] [place] [address]-
8945     

 

 [facility] [place] [facility]  [address] [id number] [place] [address]-
7462     

 

 [facility] [place] [facility]  [address] [id number] [place] [address]-
2929     

 

 [facility] [place] [facility]  [address] [id number] [place] [address]-
6275  15 Harsha, 2014   

 

 [facility] [place] [facility]  [address] [id number] [place] [address]-
0925  15 Harsha, 2014   

 

 [facility] [place] [facility]  [address] [id number] [place] [address]-
7285     

 

 [facility] [place] [facility]  [address] [id number] [place] [address]-
1779     

 

 [facility] [place] [facility]  [address] [id number] [place] [address]-
3728  30 Aug, 2013   

 

 [facility] [place] [facility]  [address] [id number] [place] [address]-
8518  19 Aug, 2013   

 

 [facility] [place] [facility]  [address] [id number] [place] [address]-
2665  15 Aug, 2013   

 

 [facility] [place] [facility]  [address] [id number] [place] [address]-
0484  14 Aug, 2013   

 

 [facility] [place] [facility]  [address] [id number] [place] [address]-
6516  13 Aug, 2013   

 

 [facility] [place] [facility]  [address] [id number] [place] [address]-
9819  27 Mar, 2013   

 

 [facility] [place] [facility]  [address] [id number] [place] [address]-
6200  20 Mar, 2013   

 

 [facility] [place] [facility]  [address] [id number] [place] [address]-
6222     

 

 [facility]  [address] [place] [id number] [address]-
[address]     

 

 [facility]  [address]100[place] [address]-
6206     

 

 [facility]  [address] [address]
2546  15 Mar, 2012   

 

 [facility]  [address] [place] [id number] [place] [address]
2546  05 Mar, 2012   

 

 [facility]  [address] [place] [address]-
59[address]     

 

 [facility]  [id number] [place] [address]-
3536  23 Dec, 2011   

 

 [facility]  [address] [place] [address]-
0836  14 Sep, 2011   

 

 [facility]  [address] [place] [address]-
4763  21 Dec, 2010   

 

 [facility]  [address] [place] [address]-
5453  13 Oct, 2010   

 

 [facility]  [address]100[place], [place] [address]-
0868  13 Oct, 2010   

 

 [facility]  [address]100[place], [place] [address]-
8072  14 Aug, 2009   

 

 [facility]  [address], [place] [address]
2545     







IMMUNIZATIONS

No Known Immunizations



SOCIAL HISTORY

Never Assessed



REASON FOR VISIT

Lab (walk-in)



PLAN OF CARE





VITAL SIGNS





MEDICATIONS

Unknown Medications



RESULTS







 Name  Result  Date  Reference Range

 

 PROLACTIN     2017   

 

 Prolactin  10.4     4.8-23.3

 

 ESTRADIOL     2017   

 

 Estradiol  41.1      

 

 THYROID ANALYZER     2017   

 

 TSH  1.030     0.450-4.500

 

 A1C     2017   

 

 Hemoglobin A1c  5.3     4.8-5.6

 

 FSH, SERUM     2017   

 

 FSH  5.9      

 

 INSULIN LEVEL     2017   

 

 Insulin  11.3     2.6-24.9

 

 CBC     2017   

 

 WBC  4.9     3.4-10.8

 

 RBC  4.55     3.77-5.28

 

 Hemoglobin  12.1     11.1-15.9

 

 Hematocrit  37.8     34.0-46.6

 

 MCV  83     79-97

 

 MCH  26.6     26.6-33.0

 

 MCHC  32.0     31.5-35.7

 

 RDW  13.8     12.3-15.4

 

 Platelets  334     150-379

 

 Neutrophils  54      

 

 Lymphs  36      

 

 Monocytes  5      

 

 Eos  4      

 

 Basos  1      

 

 Neutrophils (Absolute)  2.6     1.4-7.0

 

 Lymphs (Absolute)  1.8     0.7-3.1

 

 Monocytes(Absolute)  0.2     0.1-0.9

 

 Eos (Absolute)  0.2     0.0-0.4

 

 Baso (Absolute)  0.1     0.0-0.2

 

 Immature Granulocytes  0      

 

 Immature Grans (Abs)  0.0     0.0-0.1

 

 LIPID PANEL     2017   

 

 Cholesterol, Total  148     100-199

 

 Triglycerides  57     0-149

 

 HDL Cholesterol  48     >39

 

 VLDL Cholesterol Rodolfo  11     5-40

 

 LDL Cholesterol Calc  89     0-99

 

 CMP     2017   

 

 Glucose, Serum  88     65-99

 

 BUN  10     6-20

 

 Creatinine, Serum  0.73     0.57-1.00

 

 eGFR If NonAfricn Am  116         >59

 

 eGFR If Africn Am  133         >59

 

 BUN/Creatinine Ratio  14     9-23

 

 Sodium, Serum  142     134-144

 

 Potassium, Serum  4.7     3.5-5.2

 

 Chloride, Serum  104     

 

 Carbon Dioxide, Total  23     18-29

 

 Calcium, Serum  9.2     8.7-10.2

 

 Protein, Total, Serum  6.9     6.0-8.5

 

 Albumin, Serum  4.2     3.5-5.5

 

 Globulin, Total  2.7     1.5-4.5

 

 A/G Ratio  1.6     1.2-2.2

 

 Bilirubin, Total  0.5     0.0-1.2

 

 Alkaline Phosphatase, S  49     

 

 AST (SGOT)  17     0-40

 

 ALT (SGPT)  17     0-32







PROCEDURES







 Procedure  Date Ordered  Result  Body Site

 

 VENIPUNCT, ROUTINE*  2017      

 

 ASSAY OF PROLACTIN  2017      

 

 ASSAY OF ESTRADIOL  2017      

 

 LIPID PANEL  2017      

 

 COMPLETE CBC W/AUTO DIFF WBC  2017      

 

 COMPREHEN METABOLIC PANEL  2017      

 

 Hemoglobin Test Send Out 0 dollar  2017      

 

 ASSAY THYROID STIM HORMONE  2017      

 

 ASSAY OF INSULIN  2017      

 

 GONADOTROPIN (FSH)  2017      







INSTRUCTIONS





MEDICATIONS ADMINISTERED

No Known Medications



MEDICAL (GENERAL) HISTORY







 Type  Description  Date

 

 Medical History  Intestinal infection due to other organism   

 

 Hospitalization History  pneumonia as a child

## 2018-04-29 NOTE — XMS REPORT
[facility]

 Created on: 10/23/2015



Louise Duvall

External Reference #: [id number]

: 1992

Sex: Female



Demographics







 Address  [address] [place]  [address]

 

 Home Phone  [phone]

 

 Preferred Language  Unknown

 

 Marital Status  Unknown

 

 Gnosticist Affiliation  Unknown

 

 Race  Unreported/Refused to Report

 

 Ethnic Group  Not  or 





Author







 Author  KISHOR WOODS

 

 [facility]  eClinical[employer]

 

 Address  Unknown

 

 Phone  Unavailable







Care Team Providers







 Care Team Member Name  Role  Phone

 

 KISHOR WOODS  CP  Unavailable



                                                                



Allergies, Adverse Reactions, Alerts

          





 Substance  Reaction  Event Type

 

 N.K.D.A.  Info Not Available  Non Drug Allergy



                                                                               
         



Problems

          





 Problem Type  Condition  Code  Onset Dates  Condition Status

 

 Assessment  Weight gain  R63.5     Active

 

 Assessment  Acne  L70.9     Active

 

 Problem  Referred otogenic pain, unspecified laterality  H92.09     Active

 

 Problem  Obesity, unspecified obesity severity, unspecified obesity type  
E66.9     Active

 

 Problem  Counseling for birth control, oral contraceptives  Z30.9     Active

 

 Assessment  Missed period  N92.6     Active

 

 Assessment  Vaginal discharge  N89.8     Active

 

 Problem  Absence of menstruation  N91.2     Active

 

 Assessment  Encounter for screening for malignant neoplasm of cervix  Z12.4   
  Active



                                                                               
                                                                               
          



Medications

          No Known Medications                                                 
                                       



Procedures

          





 Procedure  Coding System  Code  Date

 

 SPECIMEN HANDLING  CPT-4  78308  Oct 22, 2015

 

 ASSAY THYROID STIM HORMONE  CPT-4  52198  Oct 22, 2015

 

 URINE PREGNANCY TEST  CPT-4  16363  Oct 22, 2015

 

 VENIPUNCT, ROUTINE*  CPT-4  50288  Oct 22, 2015

 

 Office Visit, Est Pt., Level 3  CPT-4  08203  Oct 22, 2015

 

 No Charge  CPT-4  54487  Oct 22, 2015

 

 ASSAY OF PROLACTIN  CPT-4  31092  Oct 22, 2015

 

 TRICHOMONAS VAGIN, DIR PROBE  CPT-4  13998  Oct 22, 2015

 

 CULTURE, BACTERIA, OTHER  CPT-4  45469  Oct 22, 2015



                                                                               
                                                                               
                    



Vital Signs

          





 Date/Time:  Oct 22, 2015

 

 Temperature  97.0 F

 

 Weight  177.8 lbs

 

 Height  62 in

 

 BMI  32.52 Index

 

 Blood Pressure Diastolic  70 mmHg

 

 Blood Pressure Systolic  100 mmHg

 

 Cardiac Monitoring Heart Rate  72 bpm



                                                                    



Results

          





 Name  Result  Date  Reference Range  Unit  Abnormality Flag

 

 PREGNANCY TEST, SERUM (QUAL)               

 

 PREGNANCY TEST, URINE (IN HOUSE)               

 

 PROLACTIN               

 

 ----Prolactin  14.1  2015  4.8-23.3   ng/mL   



                                                                               
         



Summary Purpose

          eClinicalWorks Submission

## 2018-04-29 NOTE — XMS REPORT
[facility]

 Created on: 2016



Louise Duvall

External Reference #: [id number]

: 1992

Sex: Female



Demographics







 Address  [address] [place]  [address]

 

 Home Phone  [phone]

 

 Preferred Language  Unknown

 

 Marital Status  Unknown

 

 Jewish Affiliation  Unknown

 

 Race  Unreported/Refused to Report

 

 Ethnic Group  Not  or 





Author







 Author  MIANE KC

 

 [facility]  eClinicalWorks

 

 Address  Unknown

 

 Phone  Unavailable







Care Team Providers







 Care Team Member Name  Role  Phone

 

 MAINE KC  CP  Unavailable



                                                                



Allergies, Adverse Reactions, Alerts

          





 Substance  Reaction  Event Type

 

 N.K.D.A.  Info Not Available  Non Drug Allergy



                                                                               
         



Problems

          





 Problem Type  Condition  Code  Onset Dates  Condition Status

 

 Problem  Referred otogenic pain, unspecified laterality  H92.09     Active

 

 Problem  Obesity, unspecified obesity severity, unspecified obesity type  
E66.9     Active

 

 Problem  Counseling for birth control, oral contraceptives  Z30.9     Active

 

 Problem  Absence of menstruation  N91.2     Active

 

 Assessment  Acute cystitis without hematuria  N30.00     Active



                                                                               
                                                 



Medications

          





 Medication  Code System  Code  Instructions  Start Date  End Date  Status  
Dosage

 

 Pyridium  NDC  [address]-98  200 MG Orally Three times a day           1 
tablet after meals

 

 Bactrim DS  NDC  [address]-74  800-160 MG Orally Twice a day           1 
tablet



                                                                               
                   



Procedures

          





 Procedure  Coding System  Code  Date

 

 Office Visit, Est Pt., Level 3  CPT-4  62928  2016



                                                                               
                   



Vital Signs

          





 Date/Time:  2016

 

 Cardiac Monitoring Heart Rate  90 bpm

 

 Weight  183.9 lbs

 

 Height  62 in

 

 Blood Pressure Diastolic  76 mmHg

 

 Blood Pressure Systolic  108 mmHg



                                                                              



Results

          No Known Results                                                     
               



Summary Purpose

          eClinicalWorks Submission

## 2018-04-29 NOTE — XMS REPORT
[facility]

 Created on: 10/29/2015



Louise Duvall

External Reference #: [id number]

: 1992

Sex: Female



Demographics







 Address  [address] [place]  [address]

 

 Home Phone  [phone]

 

 Preferred Language  Unknown

 

 Marital Status  Unknown

 

 Anabaptist Affiliation  Unknown

 

 Race  Unreported/Refused to Report

 

 Ethnic Group  Not  or 





Author







 Author  RICKY RAEVALO

 

 [facility]  eClinicalWorks

 

 Address  Unknown

 

 Phone  Unavailable







Care Team Providers







 Care Team Member Name  Role  Phone

 

 RICKY AREVALO  CP  Unavailable



                                                                



Allergies, Adverse Reactions, Alerts

          





 Substance  Reaction  Event Type

 

 N.K.D.A.  Info Not Available  Non Drug Allergy



                                                                               
         



Problems

          





 Problem Type  Condition  Code  Onset Dates  Condition Status

 

 Problem  Referred otogenic pain, unspecified laterality  H92.09     Active

 

 Problem  Obesity, unspecified obesity severity, unspecified obesity type  
E66.9     Active

 

 Problem  Counseling for birth control, oral contraceptives  Z30.9     Active

 

 Problem  Absence of menstruation  N91.2     Active

 

 Assessment  Allergic rhinitis  J30.9     Active



                                                                               
                                                 



Medications

          





 Medication  Code System  Code  Instructions  Start Date  End Date  Status  
Dosage

 

 Day Time Cold/Flu Relief  NDC  [address]-16  10-5-325 MG Orally every 4 hrs  
         2 capsules as needed



                                                                               
         



Procedures

          





 Procedure  Coding System  Code  Date

 

 DEPO MEDROL 80 MG/ML  CPT-4    Oct 28, 2015

 

 THER/PROPH/DIAG INJ, SC/IM  CPT-4  64957  Oct 28, 2015

 

 Office Visit, Est Pt., Level 3  CPT-4  34727  Oct 28, 2015

 

 DEXAMETHASONE 4MG/ML (PER 1 MG)  CPT-4    Oct 28, 2015



                                                                               
                                                 



Vital Signs

          





 Date/Time:  Oct 28, 2015

 

 Temperature  98.2 F

 

 Weight  182.8 lbs

 

 Height  62 in

 

 BMI  33.43 Index

 

 Blood Pressure Diastolic  62 mmHg

 

 Blood Pressure Systolic  90 mmHg

 

 Cardiac Monitoring Heart Rate  64 bpm



                                                                              



Results

          No Known Results                                                     
               



Summary Purpose

          eClinicalWorks Submission

## 2019-10-13 NOTE — ED INTEGUMENTARY GENERAL
General


Chief Complaint:  Allergic Reaction


Stated Complaint:  ALLEGIC REACTION


Nursing Triage Note:  


GENERALIZED ITCHING/RASH X1 DAY, UNKNOWN CAUSE. 50MG BENADRYL PT APPROX. 2130.


Source:  patient





Allergies and Home Medications


Allergies


Coded Allergies:  


     No Known Drug Allergies (Unverified , 10/17/10)





Home Medications


Ranitidine HCl 75 Mg Tablet, Unknown Dose PO BID, (Reported)





Past Medical-Social-Family Hx


Patient Social History


Alcohol Use:  Denies Use


Recreational Drug Use:  No


Smoking Status:  Never a Smoker


2nd Hand Smoke Exposure:  No


Recent Foreign Travel:  No


Contact w/Someone Who Travel:  No


Recent Infectious Disease Expo:  No


Recent Hopitalizations:  No


Physical Abuse:  No


Sexual Abuse:  No


Mistreated:  No


Fear:  No





Immunizations Up To Date


Tetanus Booster (TDap):  Unknown


PED Vaccines UTD:  Yes





Seasonal Allergies


Seasonal Allergies:  Yes





Past Medical History


Surgeries:  No


Respiratory:  No


Cardiac:  No


Neurological:  No


Pregnant:  No


Last Menstrual Period:  Sep 21, 2019


Genitourinary:  No


Gastrointestinal:  No


Musculoskeletal:  No


Endocrine:  No


HEENT:  No


Cancer:  No


Psychosocial:  Yes


ADD/ADHD, Anxiety, Depression


Integumentary:  No


Blood Disorders:  No





Family Medical History


No Pertinent Family Hx





Physical Exam


Vital Signs





Vital Signs - First Documented








 10/12/19





 23:37


 


Temp 36.6


 


Pulse 102


 


Resp 18


 


B/P (MAP) 114/80 (91)


 


Pulse Ox 98


 


O2 Delivery Room Air





Capillary Refill : Less Than 3 Seconds





Progress/Results/Core Measures


Results/Orders


My Orders





Orders - ANUPAMA ROQUE DO


Methylprednisolone Sod Succ (Solu-Medrol (10/13/19 00:00)


Famotidine Tablet (Pepcid Tablet) (10/13/19 00:00)


Hydroxyzine Cap/Tab (Vistaril) (10/13/19 00:00)





Vital Signs/I&O











 10/12/19





 23:37


 


Temp 36.6


 


Pulse 102


 


Resp 18


 


B/P (MAP) 114/80 (91)


 


Pulse Ox 98


 


O2 Delivery Room Air














Blood Pressure Mean:                    91











Departure


Impression





   Primary Impression:  


   Hives


Disposition:  01 HOME, SELF-CARE


Condition:  Stable





Departure-Patient Inst.


Referrals:  


JH SANDY DO





[facility] [place]


Patient Instructions:  Hives (DC)





Add. Discharge Instructions:  


LOTS OF CLEAR LIQUIDS





TAKE YOUR RANITIDINE 300 MG DAILY





TAKE YOUR ALLEGRA 1-2 PILLS DAILY





FOLLOW UP WITH [facility] ON MONDAY IF NO BETTER





All discharge instructions reviewed with patient and/or family. Voiced 

understanding.


Scripts


Hydroxyzine HCl (Hydroxyzine HCl) 25 Mg Tablet


25-50 MG PO Q6H for Itching, #20 TAB


   Prov: ANUPAMA ROQUE DO         10/13/19 


Prednisone (Prednisone) 20 Mg Tab


40 MG PO DAILY, #6 TAB


   Prov: ANUPAMA ROQUE DO         10/13/19











ANUPAMA ROQUE DO                 Oct 13, 2019 00:03